# Patient Record
Sex: MALE | Race: BLACK OR AFRICAN AMERICAN | NOT HISPANIC OR LATINO
[De-identification: names, ages, dates, MRNs, and addresses within clinical notes are randomized per-mention and may not be internally consistent; named-entity substitution may affect disease eponyms.]

---

## 2020-12-31 VITALS
HEART RATE: 86 BPM | SYSTOLIC BLOOD PRESSURE: 171 MMHG | DIASTOLIC BLOOD PRESSURE: 92 MMHG | OXYGEN SATURATION: 99 % | WEIGHT: 202.83 LBS | RESPIRATION RATE: 15 BRPM | HEIGHT: 69 IN | TEMPERATURE: 98 F

## 2020-12-31 RX ORDER — CHLORHEXIDINE GLUCONATE 213 G/1000ML
1 SOLUTION TOPICAL ONCE
Refills: 0 | Status: DISCONTINUED | OUTPATIENT
Start: 2021-01-05 | End: 2021-01-19

## 2020-12-31 NOTE — H&P ADULT - HISTORY OF PRESENT ILLNESS
COVID testing @  Cardiologist:  Pharmacy:  Escort:    Confirm meds upon arrival    Patient is a 69 y/o former smoker male with PMHx of known CAD (s/p PCI of OM2), HTN, HLD, and DM II who presented to cardiologist Dr. Sheth c/o NAVARRO. Patient endorses NAVARRO with ambulation of one-two blocks, resolved with rest and associated with chest tightness which subsequently resolves with rest. Patient denies palpitations, syncope, PND/orthopnea, or LE edema. Also denies fever, chills, N/V/D, URI symptoms, cough, recent illness, travel, or sick contacts. NST 12/10/2020: EF 50%, apical septal wall ischemia and inferior wall infarct with residual ischemia identified. Per MD note echo revealed EF 55%, LVH, dilated LA, abnormal LV compliance, mild MR.     In light of patient's risk factors, CCS III anginal symptoms, known CAD, and abnormal NST patient is referred for cardiac catheterization with possible intervention if clinically indicated to r/o progression of CAD         COVID testing @ 1/02/2021 at [place]  Cardiologist: Dr. Sheth   Pharmacy: [facility], [phone]  Escort: wife    Confirm meds upon arrival    Patient is a 69 y/o former smoker male with PMHx of known CAD (s/p PCI of OM2, 12 years ago), HTN, HLD, and DM II who presented to cardiologist Dr. Sheth c/o NAVARRO. Patient endorses NAVARRO with ambulation of one-two blocks, resolved with rest and associated with chest tightness which subsequently resolves with rest. Patient denies palpitations, syncope, PND/orthopnea, or LE edema. Also denies fever, chills, N/V/D, URI symptoms, cough, recent illness, travel, or sick contacts. NST 12/10/2020: EF 50%, apical septal wall ischemia and inferior wall infarct with residual ischemia identified. Per MD note echo revealed EF 55%, LVH, dilated LA, abnormal LV compliance, mild MR.     In light of patient's risk factors, CCS III anginal symptoms, known CAD, and abnormal NST patient is referred for cardiac catheterization with possible intervention if clinically indicated to r/o progression of CAD         COVID testing @ 1/02/2021 at [place] negative per [facility]  Cardiologist: Dr. Sheth   Pharmacy: [facility], [phone]  Escort: wife    Confirm meds upon arrival    Patient is a 69 y/o former smoker male with PMHx of known CAD (s/p PCI of OM2, 12 years ago), HTN, HLD, and DM II who presented to cardiologist Dr. Sheth c/o NAVARRO. Patient endorses NAVARRO with ambulation of one-two blocks, resolved with rest and associated with chest tightness which subsequently resolves with rest. Patient denies palpitations, syncope, PND/orthopnea, or LE edema. Also denies fever, chills, N/V/D, URI symptoms, cough, recent illness, travel, or sick contacts. NST 12/10/2020: EF 50%, apical septal wall ischemia and inferior wall infarct with residual ischemia identified. Per MD note echo revealed EF 55%, LVH, dilated LA, abnormal LV compliance, mild MR.     In light of patient's risk factors, CCS III anginal symptoms, known CAD, and abnormal NST patient is referred for cardiac catheterization with possible intervention if clinically indicated.         COVID negative @ 1/02/2021 at [place] negative per [facility]  Cardiologist: Dr. Sheth   Pharmacy: [facility], [phone]  Escort: Wife     71 y/o male, former smoker, w/ PMHx known CAD (s/p PCI of OM2, 12 years ago), HTN, HLD, and type II DM who presented to cardiologist, Dr. Sheth, c/o NAVARRO w/ ambulation of one-two blocks, resolved w/ rest and associated w/ chest tightness which subsequently resolves w/ rest. Patient denied palpitations, syncope, PND/orthopnea, LE edema, fever, chills, N/V/D, URI symptoms, cough, recent illness, travel, or sick contacts. NST 12/10/2020 revealed EF 50%, apical septal wall ischemia and inferior wall infarct w/ residual ischemia identified. Per MD note, Echocardiogram revealed EF 55%, LVH, dilated LA, abnormal LV compliance, and mild MR.     In light of patient's risk factors, CCS III anginal symptoms, known CAD, and abnormal NST patient is referred for cardiac catheterization w/ possible intervention if clinically indicated.

## 2020-12-31 NOTE — H&P ADULT - ASSESSMENT
69 y/o male, former smoker, w/ PMHx known CAD (s/p PCI of OM2, 12 years ago), HTN, HLD, and type II DM who presents for cardiac catheterization w/ possible intervention if clinically indicated in light of patient's risk factors, CCS class III anginal symptoms, and abnormal NST results.     EKG on arrival showed NSR at 80 bpm w/o acute ischemic changes. Labs on arrival significant for WBC 12.28; patient denies any fever, chills, cough, known sick contacts, or urinary symptoms. Labs otherwise within normal limits and Dr. Sheth was made aware. Patient reported he did not take his daily medication of Aspirin 81 mg daily prior to arrival. Patient was ordered for Aspirin 81 mg PO once in addition to Plavix 600 mg PO once for pre-procedure load. Patient was also ordered for NS 75 cc/hour x 4 hours. Of note, patient's BP was elevated to 171/92 mmHg on arrival to cath lab holding. Patient reported taking his home medication of Telmisartan 40 mg daily prior to arrival on 01/05/2021, but patient did not take his home medication of Toprol XL 25 mg daily. Patient was given Toprol XL 25 mg daily prior to procedure.   					  ASA: III			Mallampati class: III	            Anginal Class: III      Sedation Plan: Moderate     Patient Is Suitable Candidate For Sedation: Yes     Risks & benefits of procedure and sedation and risks and benefits for the alternative therapy have been explained to the patient in layman’s terms including but not limited to: allergic reaction, bleeding, infection, arrhythmia, respiratory compromise, renal and vascular compromise, limb damage, MI, CVA, emergent CABG/Vascular Surgery and death. Informed consent obtained and in chart.

## 2020-12-31 NOTE — H&P ADULT - NSICDXPASTSURGICALHX_GEN_ALL_CORE_FT
PAST SURGICAL HISTORY:  Cataract L cataract surgery 6/09     PAST SURGICAL HISTORY:  Cataract L cataract surgery 6/09    H/O knee surgery     Previous back surgery

## 2020-12-31 NOTE — H&P ADULT - NSHPLABSRESULTS_GEN_ALL_CORE
CBC Full  -  ( 05 Jan 2021 07:40 )  WBC Count : 12.28 K/uL  RBC Count : 5.39 M/uL  Hemoglobin : 14.2 g/dL  Hematocrit : 43.9 %  Platelet Count - Automated : 293 K/uL  Mean Cell Volume : 81.4 fl  Mean Cell Hemoglobin : 26.3 pg  Mean Cell Hemoglobin Concentration : 32.3 gm/dL  Auto Neutrophil # : 8.42 K/uL  Auto Lymphocyte # : 2.38 K/uL  Auto Monocyte # : 0.98 K/uL  Auto Eosinophil # : 0.32 K/uL  Auto Basophil # : 0.08 K/uL  Auto Neutrophil % : 68.5 %  Auto Lymphocyte % : 19.4 %  Auto Monocyte % : 8.0 %  Auto Eosinophil % : 2.6 %  Auto Basophil % : 0.7 %    01-05    138  |  100  |  9   ----------------------------<  185<H>  4.2   |  26  |  0.81    Ca    9.1      05 Jan 2021 07:40    TPro  7.8  /  Alb  4.2  /  TBili  1.0  /  DBili  x   /  AST  16  /  ALT  22  /  AlkPhos  99  01-05      PT/INR - ( 05 Jan 2021 07:40 )   PT: 12.2 sec;   INR: 1.02          PTT - ( 05 Jan 2021 07:40 )  PTT:32.0 sec

## 2020-12-31 NOTE — H&P ADULT - NSICDXPASTMEDICALHX_GEN_ALL_CORE_FT
PAST MEDICAL HISTORY:  Coronary artery disease     Diabetes     Gout     Hyperlipidemia     Hypertension

## 2020-12-31 NOTE — H&P ADULT - NSHPSOCIALHISTORY_GEN_ALL_CORE
Former smoker Former smoker x 20 years 0.5ppd, quit 12 years ago  Denies ETOH or illicit drug use Patient is a former smoker, quit 12 years ago, and prevuiously smoked 1/2 PPD for 20 years. Patient denies any ETOH use or illicit drug use.

## 2021-01-02 ENCOUNTER — APPOINTMENT (OUTPATIENT)
Dept: DISASTER EMERGENCY | Facility: CLINIC | Age: 71
End: 2021-01-02

## 2021-01-02 PROBLEM — I10 ESSENTIAL (PRIMARY) HYPERTENSION: Chronic | Status: ACTIVE | Noted: 2020-12-31

## 2021-01-02 PROBLEM — Z00.00 ENCOUNTER FOR PREVENTIVE HEALTH EXAMINATION: Status: ACTIVE | Noted: 2021-01-02

## 2021-01-02 PROBLEM — E78.5 HYPERLIPIDEMIA, UNSPECIFIED: Chronic | Status: ACTIVE | Noted: 2020-12-31

## 2021-01-02 PROBLEM — I25.10 ATHEROSCLEROTIC HEART DISEASE OF NATIVE CORONARY ARTERY WITHOUT ANGINA PECTORIS: Chronic | Status: ACTIVE | Noted: 2020-12-31

## 2021-01-02 PROBLEM — E11.9 TYPE 2 DIABETES MELLITUS WITHOUT COMPLICATIONS: Chronic | Status: ACTIVE | Noted: 2020-12-31

## 2021-01-03 LAB — SARS-COV-2 N GENE NPH QL NAA+PROBE: NOT DETECTED

## 2021-01-05 ENCOUNTER — RESULT REVIEW (OUTPATIENT)
Age: 71
End: 2021-01-05

## 2021-01-05 ENCOUNTER — OUTPATIENT (OUTPATIENT)
Dept: OUTPATIENT SERVICES | Facility: HOSPITAL | Age: 71
LOS: 1 days | Discharge: HOME | End: 2021-01-05
Payer: MEDICARE

## 2021-01-05 ENCOUNTER — NON-APPOINTMENT (OUTPATIENT)
Age: 71
End: 2021-01-05

## 2021-01-05 DIAGNOSIS — Z98.890 OTHER SPECIFIED POSTPROCEDURAL STATES: Chronic | ICD-10-CM

## 2021-01-05 LAB
A1C WITH ESTIMATED AVERAGE GLUCOSE RESULT: 8.1 % — HIGH (ref 4–5.6)
ALBUMIN SERPL ELPH-MCNC: 4.2 G/DL — SIGNIFICANT CHANGE UP (ref 3.3–5)
ALP SERPL-CCNC: 99 U/L — SIGNIFICANT CHANGE UP (ref 40–120)
ALT FLD-CCNC: 22 U/L — SIGNIFICANT CHANGE UP (ref 10–45)
ANION GAP SERPL CALC-SCNC: 12 MMOL/L — SIGNIFICANT CHANGE UP (ref 5–17)
APPEARANCE UR: CLEAR — SIGNIFICANT CHANGE UP
APTT BLD: 32 SEC — SIGNIFICANT CHANGE UP (ref 27.5–35.5)
AST SERPL-CCNC: 16 U/L — SIGNIFICANT CHANGE UP (ref 10–40)
BASOPHILS # BLD AUTO: 0.07 K/UL — SIGNIFICANT CHANGE UP (ref 0–0.2)
BASOPHILS # BLD AUTO: 0.08 K/UL — SIGNIFICANT CHANGE UP (ref 0–0.2)
BASOPHILS NFR BLD AUTO: 0.6 % — SIGNIFICANT CHANGE UP (ref 0–2)
BASOPHILS NFR BLD AUTO: 0.7 % — SIGNIFICANT CHANGE UP (ref 0–2)
BILIRUB SERPL-MCNC: 1 MG/DL — SIGNIFICANT CHANGE UP (ref 0.2–1.2)
BILIRUB UR-MCNC: NEGATIVE — SIGNIFICANT CHANGE UP
BLD GP AB SCN SERPL QL: NEGATIVE — SIGNIFICANT CHANGE UP
BUN SERPL-MCNC: 9 MG/DL — SIGNIFICANT CHANGE UP (ref 7–23)
CALCIUM SERPL-MCNC: 9.1 MG/DL — SIGNIFICANT CHANGE UP (ref 8.4–10.5)
CHLORIDE SERPL-SCNC: 100 MMOL/L — SIGNIFICANT CHANGE UP (ref 96–108)
CHOLEST SERPL-MCNC: 133 MG/DL — SIGNIFICANT CHANGE UP
CK MB CFR SERPL CALC: 1.6 NG/ML — SIGNIFICANT CHANGE UP (ref 0–6.7)
CK SERPL-CCNC: 52 U/L — SIGNIFICANT CHANGE UP (ref 30–200)
CO2 SERPL-SCNC: 26 MMOL/L — SIGNIFICANT CHANGE UP (ref 22–31)
COLOR SPEC: YELLOW — SIGNIFICANT CHANGE UP
CREAT SERPL-MCNC: 0.81 MG/DL — SIGNIFICANT CHANGE UP (ref 0.5–1.3)
CRP SERPL-MCNC: 0.7 MG/DL — HIGH (ref 0–0.4)
DIFF PNL FLD: NEGATIVE — SIGNIFICANT CHANGE UP
EOSINOPHIL # BLD AUTO: 0.22 K/UL — SIGNIFICANT CHANGE UP (ref 0–0.5)
EOSINOPHIL # BLD AUTO: 0.32 K/UL — SIGNIFICANT CHANGE UP (ref 0–0.5)
EOSINOPHIL NFR BLD AUTO: 1.9 % — SIGNIFICANT CHANGE UP (ref 0–6)
EOSINOPHIL NFR BLD AUTO: 2.6 % — SIGNIFICANT CHANGE UP (ref 0–6)
ESTIMATED AVERAGE GLUCOSE: 186 MG/DL — HIGH (ref 68–114)
GLUCOSE SERPL-MCNC: 185 MG/DL — HIGH (ref 70–99)
GLUCOSE UR QL: NEGATIVE — SIGNIFICANT CHANGE UP
HCT VFR BLD CALC: 40.2 % — SIGNIFICANT CHANGE UP (ref 39–50)
HCT VFR BLD CALC: 43.9 % — SIGNIFICANT CHANGE UP (ref 39–50)
HCV AB S/CO SERPL IA: 3.4 S/CO — SIGNIFICANT CHANGE UP
HCV AB SERPL-IMP: ABNORMAL
HDLC SERPL-MCNC: 46 MG/DL — SIGNIFICANT CHANGE UP
HGB BLD-MCNC: 13.2 G/DL — SIGNIFICANT CHANGE UP (ref 13–17)
HGB BLD-MCNC: 14.2 G/DL — SIGNIFICANT CHANGE UP (ref 13–17)
IMM GRANULOCYTES NFR BLD AUTO: 0.6 % — SIGNIFICANT CHANGE UP (ref 0–1.5)
IMM GRANULOCYTES NFR BLD AUTO: 0.8 % — SIGNIFICANT CHANGE UP (ref 0–1.5)
INR BLD: 1.02 — SIGNIFICANT CHANGE UP (ref 0.88–1.16)
KETONES UR-MCNC: NEGATIVE — SIGNIFICANT CHANGE UP
LEUKOCYTE ESTERASE UR-ACNC: NEGATIVE — SIGNIFICANT CHANGE UP
LIPID PNL WITH DIRECT LDL SERPL: 62 MG/DL — SIGNIFICANT CHANGE UP
LYMPHOCYTES # BLD AUTO: 1.89 K/UL — SIGNIFICANT CHANGE UP (ref 1–3.3)
LYMPHOCYTES # BLD AUTO: 16.3 % — SIGNIFICANT CHANGE UP (ref 13–44)
LYMPHOCYTES # BLD AUTO: 19.4 % — SIGNIFICANT CHANGE UP (ref 13–44)
LYMPHOCYTES # BLD AUTO: 2.38 K/UL — SIGNIFICANT CHANGE UP (ref 1–3.3)
MCHC RBC-ENTMCNC: 26.3 PG — LOW (ref 27–34)
MCHC RBC-ENTMCNC: 26.5 PG — LOW (ref 27–34)
MCHC RBC-ENTMCNC: 32.3 GM/DL — SIGNIFICANT CHANGE UP (ref 32–36)
MCHC RBC-ENTMCNC: 32.8 GM/DL — SIGNIFICANT CHANGE UP (ref 32–36)
MCV RBC AUTO: 80.6 FL — SIGNIFICANT CHANGE UP (ref 80–100)
MCV RBC AUTO: 81.4 FL — SIGNIFICANT CHANGE UP (ref 80–100)
MONOCYTES # BLD AUTO: 0.81 K/UL — SIGNIFICANT CHANGE UP (ref 0–0.9)
MONOCYTES # BLD AUTO: 0.98 K/UL — HIGH (ref 0–0.9)
MONOCYTES NFR BLD AUTO: 7 % — SIGNIFICANT CHANGE UP (ref 2–14)
MONOCYTES NFR BLD AUTO: 8 % — SIGNIFICANT CHANGE UP (ref 2–14)
NEUTROPHILS # BLD AUTO: 8.42 K/UL — HIGH (ref 1.8–7.4)
NEUTROPHILS # BLD AUTO: 8.5 K/UL — HIGH (ref 1.8–7.4)
NEUTROPHILS NFR BLD AUTO: 68.5 % — SIGNIFICANT CHANGE UP (ref 43–77)
NEUTROPHILS NFR BLD AUTO: 73.6 % — SIGNIFICANT CHANGE UP (ref 43–77)
NITRITE UR-MCNC: NEGATIVE — SIGNIFICANT CHANGE UP
NON HDL CHOLESTEROL: 87 MG/DL — SIGNIFICANT CHANGE UP
NRBC # BLD: 0 /100 WBCS — SIGNIFICANT CHANGE UP (ref 0–0)
NRBC # BLD: 0 /100 WBCS — SIGNIFICANT CHANGE UP (ref 0–0)
PH UR: 7.5 — SIGNIFICANT CHANGE UP (ref 5–8)
PLATELET # BLD AUTO: 270 K/UL — SIGNIFICANT CHANGE UP (ref 150–400)
PLATELET # BLD AUTO: 293 K/UL — SIGNIFICANT CHANGE UP (ref 150–400)
POTASSIUM SERPL-MCNC: 4.2 MMOL/L — SIGNIFICANT CHANGE UP (ref 3.5–5.3)
POTASSIUM SERPL-SCNC: 4.2 MMOL/L — SIGNIFICANT CHANGE UP (ref 3.5–5.3)
PROT SERPL-MCNC: 7.8 G/DL — SIGNIFICANT CHANGE UP (ref 6–8.3)
PROT UR-MCNC: NEGATIVE MG/DL — SIGNIFICANT CHANGE UP
PROTHROM AB SERPL-ACNC: 12.2 SEC — SIGNIFICANT CHANGE UP (ref 10.6–13.6)
RBC # BLD: 4.99 M/UL — SIGNIFICANT CHANGE UP (ref 4.2–5.8)
RBC # BLD: 5.39 M/UL — SIGNIFICANT CHANGE UP (ref 4.2–5.8)
RBC # FLD: 15.7 % — HIGH (ref 10.3–14.5)
RBC # FLD: 15.8 % — HIGH (ref 10.3–14.5)
RH IG SCN BLD-IMP: POSITIVE — SIGNIFICANT CHANGE UP
SODIUM SERPL-SCNC: 138 MMOL/L — SIGNIFICANT CHANGE UP (ref 135–145)
SP GR SPEC: 1.01 — SIGNIFICANT CHANGE UP (ref 1–1.03)
TRIGL SERPL-MCNC: 126 MG/DL — SIGNIFICANT CHANGE UP
TSH SERPL-MCNC: 1.85 UIU/ML — SIGNIFICANT CHANGE UP (ref 0.35–4.94)
UROBILINOGEN FLD QL: 0.2 E.U./DL — SIGNIFICANT CHANGE UP
WBC # BLD: 11.56 K/UL — HIGH (ref 3.8–10.5)
WBC # BLD: 12.28 K/UL — HIGH (ref 3.8–10.5)
WBC # FLD AUTO: 11.56 K/UL — HIGH (ref 3.8–10.5)
WBC # FLD AUTO: 12.28 K/UL — HIGH (ref 3.8–10.5)

## 2021-01-05 PROCEDURE — 81003 URINALYSIS AUTO W/O SCOPE: CPT

## 2021-01-05 PROCEDURE — 71045 X-RAY EXAM CHEST 1 VIEW: CPT | Mod: 26

## 2021-01-05 PROCEDURE — 85730 THROMBOPLASTIN TIME PARTIAL: CPT

## 2021-01-05 PROCEDURE — 86803 HEPATITIS C AB TEST: CPT

## 2021-01-05 PROCEDURE — C1769: CPT

## 2021-01-05 PROCEDURE — 82553 CREATINE MB FRACTION: CPT

## 2021-01-05 PROCEDURE — 93880 EXTRACRANIAL BILAT STUDY: CPT

## 2021-01-05 PROCEDURE — 86850 RBC ANTIBODY SCREEN: CPT

## 2021-01-05 PROCEDURE — 93458 L HRT ARTERY/VENTRICLE ANGIO: CPT

## 2021-01-05 PROCEDURE — 94150 VITAL CAPACITY TEST: CPT

## 2021-01-05 PROCEDURE — 93010 ELECTROCARDIOGRAM REPORT: CPT

## 2021-01-05 PROCEDURE — 93880 EXTRACRANIAL BILAT STUDY: CPT | Mod: 26

## 2021-01-05 PROCEDURE — 80061 LIPID PANEL: CPT

## 2021-01-05 PROCEDURE — 82550 ASSAY OF CK (CPK): CPT

## 2021-01-05 PROCEDURE — 86900 BLOOD TYPING SEROLOGIC ABO: CPT

## 2021-01-05 PROCEDURE — 80053 COMPREHEN METABOLIC PANEL: CPT

## 2021-01-05 PROCEDURE — 99205 OFFICE O/P NEW HI 60 MIN: CPT

## 2021-01-05 PROCEDURE — C1887: CPT

## 2021-01-05 PROCEDURE — 85610 PROTHROMBIN TIME: CPT

## 2021-01-05 PROCEDURE — 83036 HEMOGLOBIN GLYCOSYLATED A1C: CPT

## 2021-01-05 PROCEDURE — 84443 ASSAY THYROID STIM HORMONE: CPT

## 2021-01-05 PROCEDURE — 94010 BREATHING CAPACITY TEST: CPT | Mod: 26

## 2021-01-05 PROCEDURE — 85025 COMPLETE CBC W/AUTO DIFF WBC: CPT

## 2021-01-05 PROCEDURE — 86901 BLOOD TYPING SEROLOGIC RH(D): CPT

## 2021-01-05 PROCEDURE — 93458 L HRT ARTERY/VENTRICLE ANGIO: CPT | Mod: 26

## 2021-01-05 PROCEDURE — C1894: CPT

## 2021-01-05 PROCEDURE — 71045 X-RAY EXAM CHEST 1 VIEW: CPT

## 2021-01-05 PROCEDURE — 93005 ELECTROCARDIOGRAM TRACING: CPT

## 2021-01-05 PROCEDURE — 86140 C-REACTIVE PROTEIN: CPT

## 2021-01-05 RX ORDER — SODIUM CHLORIDE 9 MG/ML
500 INJECTION INTRAMUSCULAR; INTRAVENOUS; SUBCUTANEOUS
Refills: 0 | Status: DISCONTINUED | OUTPATIENT
Start: 2021-01-05 | End: 2021-01-05

## 2021-01-05 RX ORDER — ASPIRIN/CALCIUM CARB/MAGNESIUM 324 MG
81 TABLET ORAL ONCE
Refills: 0 | Status: COMPLETED | OUTPATIENT
Start: 2021-01-05 | End: 2021-01-05

## 2021-01-05 RX ORDER — METOPROLOL TARTRATE 50 MG
25 TABLET ORAL ONCE
Refills: 0 | Status: DISCONTINUED | OUTPATIENT
Start: 2021-01-05 | End: 2021-01-19

## 2021-01-05 RX ORDER — SODIUM CHLORIDE 9 MG/ML
500 INJECTION INTRAMUSCULAR; INTRAVENOUS; SUBCUTANEOUS
Refills: 0 | Status: DISCONTINUED | OUTPATIENT
Start: 2021-01-05 | End: 2021-01-19

## 2021-01-05 RX ORDER — CLOPIDOGREL BISULFATE 75 MG/1
600 TABLET, FILM COATED ORAL ONCE
Refills: 0 | Status: COMPLETED | OUTPATIENT
Start: 2021-01-05 | End: 2021-01-05

## 2021-01-05 RX ADMIN — Medication 81 MILLIGRAM(S): at 08:46

## 2021-01-05 RX ADMIN — SODIUM CHLORIDE 75 MILLILITER(S): 9 INJECTION INTRAMUSCULAR; INTRAVENOUS; SUBCUTANEOUS at 12:17

## 2021-01-05 RX ADMIN — CLOPIDOGREL BISULFATE 600 MILLIGRAM(S): 75 TABLET, FILM COATED ORAL at 08:46

## 2021-01-05 RX ADMIN — SODIUM CHLORIDE 75 MILLILITER(S): 9 INJECTION INTRAMUSCULAR; INTRAVENOUS; SUBCUTANEOUS at 08:41

## 2021-01-05 NOTE — CONSULT NOTE ADULT - SUBJECTIVE AND OBJECTIVE BOX
69 y/o male, former smoker, w/ PMHx known CAD (s/p PCI of OM2, 12 years ago), HTN, HLD, and type II DM who presented to cardiologist, Dr. Sheth, c/o NAVARRO w/ ambulation of one-two blocks, resolved w/ rest and associated w/ chest tightness which subsequently resolves w/ rest. Patient denied palpitations, syncope, PND/orthopnea, LE edema, fever, chills, N/V/D, URI symptoms, cough, recent illness, travel, or sick contacts. NST 12/10/2020 revealed EF 50%, apical septal wall ischemia and inferior wall infarct w/ residual ischemia identified. Per MD note, Echocardiogram revealed EF 55%, LVH, dilated LA, abnormal LV compliance, and mild MR.In light of patient's risk factors, CCS III anginal symptoms, known CAD, and abnormal NST patient is referred for cardiac catheterization w/ possible intervention if clinically indicated. 1/5/21 Cardiac cath revealed 3 vessel CAD. Dr. Acuna consulted for evaluation for midcab as part of hybrid procedure.    Review of Systems: Other Review of Systems: All other review of systems negative, except as noted in HPI    Allergies and Intolerances:       Allergies: 	No Known Allergies:   Home Medications:  * Patient Currently Takes Medications as of 05-Jan-2021 09:10 documented in Structured Notes · 	glipiZIDE 5 mg oral tablet, extended release: Last Dose Taken: 04-Jan-2021 AM, 1 tab(s) orally once a day · 	metFORMIN 500 mg oral tablet: Last Dose Taken: 04-Jan-2021 PM, 1 tab(s) orally 2 times a day · 	allopurinol 300 mg oral tablet: Last Dose Taken: 05-Jan-2021 AM, 1 tab(s) orally once a day · 	Toprol-XL 25 mg oral tablet, extended release: Last Dose Taken: 05-Jan-2021 AM, 1 tab(s) orally once a day · 	telmisartan 40 mg oral tablet: Last Dose Taken: 05-Jan-2021 AM, 1 tab(s) orally once a day · 	Basaglar KwikPen 100 units/mL subcutaneous solution: Last Dose Taken: 04-Jan-2021 PM, 30 unit(s) subcutaneous once a day (at bedtime) · 	atorvastatin 40 mg oral tablet: Last Dose Taken: 04-Jan-2021 PM, 1 tab(s) orally once a day (at bedtime) · 	Aspirin Enteric Coated 81 mg oral delayed release tablet: Last Dose Taken: 04-Jan-2021 AM, 1 tab(s) orally once a day  Patient History:  Past Medical, Past Surgical, and Family History: PAST MEDICAL HISTORY: Coronary artery disease   Diabetes   Gout   Hyperlipidemia   Hypertension.   PAST SURGICAL HISTORY: Cataract L cataract surgery 6/09  H/O knee surgery   Previous back surgery.   No pertinent family history in first degree relatives.   No Pertinent Family History in first degree relatives of: CAD.  Social History: Social History (marital status, living situation, occupation, tobacco use, alcohol and drug use, and sexual history): Patient is a former smoker, quit 12 years ago, and prevuiously smoked 1/2 PPD for 20 years. Patient denies any ETOH use or illicit drug use.   Tobacco Screening: · Core Measure Site	No · Has the patient used tobacco in the past 30 days?	No   Risk Assessment:  Present on Admission: Deep Venous Thrombosis	no Pulmonary Embolus	no  Heart Failure: Does this patient have a history of or has been diagnosed with heart failure? no.  Physical Exam:  Height/Weight/BSA/BMI: · Height (FEET)	5 Feet · Height (INCHES)	9 Inch(s) · Height (CENTIMETERS)	175.26 Centimeter(s) · 	 used  · Dosing Weight (KILOGRAMS)	92 kg · Dosing Weight  (POUNDS)	202.8 Pound(s) · BSA (m2)	2.08 Meter Squared · BMI (kG/m2)	30   T/HR/RR/BP: · Temp (F)	97.6 Degrees F · Temp (C)	36.4 Degrees C · Heart Rate	86 /min · Respiration Rate (breaths/min)	15 /min · BP Systolic	  171 mm Hg · BP Diastolic	92 mm Hg · Blood Pressure - Method	electronic · BP Noninvasive Mean	118 mm Hg · SpO2 (%)	99 % · O2 Delivery/Oxygen Delivery Method	room air   Physical Exam: · Constitutional	Well-developed, well nourished · Eyes	EOMI; PERRL; no drainage or redness · ENMT	No oral lesions; no gross abnormalities · Neck	detailed exam  · Neck Details	normal; supple; no JVD · Breasts	not examined · Back	not examined  · Respiratory	detailed exam · Respiratory Details	normal; clear to auscultation bilaterally; no rales; no rhonchi; no wheezes · Cardiovascular	detailed exam · Cardiovascular Details	regular rate and rhythm  no rub  no murmur  normal PMI  · Cardiovascular Details	positive S1; positive S2 · Gastrointestinal	Soft, non-tender, no hepatosplenomegaly, normal bowel sounds · Genitourinary	not examined  · Rectal	patient refused  · Extremities	detailed exam  · Extremities Details	normal; no clubbing; no cyanosis; no pedal edema · Vascular	detailed exam  · Radial Pulse	2+ bilateral · Femoral Pulse	1+ bilateral, no bruits bilateral · DP Pulse	1+ bilateral · PT Pulse	2+ bilateral · Neurological	Alert & oriented; no sensory, motor or coordination deficits, normal reflexes · Skin	No lesions; no rash · Lymph Nodes	not examined · Musculoskeletal	No joint pain, swelling or deformity; no limitation of movement · Psychiatric	Affect and characteristics of appearance, verbalizations, behaviors are appropriate   Labs and Results: Labs, Radiology, Cardiology, and Other Results: CBC Full  -  ( 05 Jan 2021 07:40 ) WBC Count : 12.28 K/uL RBC Count : 5.39 M/uL Hemoglobin : 14.2 g/dL Hematocrit : 43.9 % Platelet Count - Automated : 293 K/uL Mean Cell Volume : 81.4 fl Mean Cell Hemoglobin : 26.3 pg Mean Cell Hemoglobin Concentration : 32.3 gm/dL Auto Neutrophil # : 8.42 K/uL Auto Lymphocyte # : 2.38 K/uL Auto Monocyte # : 0.98 K/uL Auto Eosinophil # : 0.32 K/uL Auto Basophil # : 0.08 K/uL Auto Neutrophil % : 68.5 % Auto Lymphocyte % : 19.4 % Auto Monocyte % : 8.0 % Auto Eosinophil % : 2.6 % Auto Basophil % : 0.7 %  01-05  138  |  100  |  9  ----------------------------<  185<H> 4.2   |  26  |  0.81  Ca    9.1      05 Jan 2021 07:40  TPro  7.8  /  Alb  4.2  /  TBili  1.0  /  DBili  x   /  AST  16  /  ALT  22  /  AlkPhos  99  01-05   PT/INR - ( 05 Jan 2021 07:40 )   PT: 12.2 sec;   INR: 1.02       PTT - ( 05 Jan 2021 07:40 )  PTT:32.0 sec

## 2021-01-05 NOTE — CONSULT NOTE ADULT - ASSESSMENT
71 yo male with PMH of HTN, HLD, DM, CAD s/p PCIs and class III angina s/p cardiac cath that revealed severe 3 vessel CAD. Dr. Acuna reviewed the cardiac cath imaging and reports with the patient and discussed the case with Dr. Sheth.  Dr. Acuna discussed the risks, benefits and alternatives to surgery. Risks include but not limited to death, heart attack, bleeding, stroke, kidney problems and infection. He quoted a 1% operative mortality and complication risk.  He also discussed the various approaches, minimally invasive versus sternotomy. Dr. Acuna feels the patient will benefit and is a candidate for robotic assisted MIDCAB (LIMA->LAD) as part of hybrid procedure. All questions were addressed and patient agrees to proceed with surgery.     Plan:   pst today--labs, xray, carotids, pfts, u/a  covid test 1/8  SDA 1/11  pt instructed to take metoprolol the morning of surgery  instructions provided re antibacterial showers and pt given 3 sponges  pt instructed on use of incentive spirometer and demonstrated use to 1700cc  PCI of LCX to be performed post surgery

## 2021-01-05 NOTE — PROGRESS NOTE ADULT - SUBJECTIVE AND OBJECTIVE BOX
Interventional Cardiology PA SDA Discharge Note    Patient without complaints. Ambulated and voided without difficulties    Afebrile, VSS    Ext:          Right            Radial :   no  hematoma,  no  bleeding, dressing; C/D/I      Pulses:    intact RAD to baseline     A/P:        69 y/o male, former smoker, w/ PMHx known CAD (s/p PCI of OM2, 12 years ago), HTN, HLD, and type II DM who presented to cardiologist, Dr. Sheth, c/o NAVARRO w/ ambulation of one-two blocks, resolved w/ rest and associated w/ chest tightness which subsequently resolves w/ rest. Patient denied palpitations, syncope, PND/orthopnea, LE edema, fever, chills, N/V/D, URI symptoms, cough, recent illness, travel, or sick contacts. NST 12/10/2020 revealed EF 50%, apical septal wall ischemia and inferior wall infarct w/ residual ischemia identified. Per MD note, Echocardiogram revealed EF 55%, LVH, dilated LA, abnormal LV compliance, and mild MR.     S/p cardiac cath revealing distalLM 50%, ostialLAD 90%, midLAD 90%, proxLCx 90%, OM2 patent stent iFR 0.93, distalRCA 100% w/ L-R collaterals. EF50%, EDP 11mmHg,       Patient being evaluate by Dr. Acuna CT surgery for LIMA to LAD with PCI to LCx  Patient advised to hold metforming for 2 days following procedure.     1.	Stable for discharge as per attending Dr. Sheth after bed rest, pt voids, groin/wrist stable and 30 minutes of ambulation.  2.	Follow-up with PMD/Cardiologist Dr. Sheth in 1-2 weeks  3.	Discharged forms signed and copies in chart

## 2021-01-06 ENCOUNTER — NON-APPOINTMENT (OUTPATIENT)
Age: 71
End: 2021-01-06

## 2021-01-06 VITALS — WEIGHT: 202.83 LBS | HEIGHT: 69 IN | BODY MASS INDEX: 30.04 KG/M2

## 2021-01-06 VITALS
SYSTOLIC BLOOD PRESSURE: 171 MMHG | OXYGEN SATURATION: 99 % | WEIGHT: 202.83 LBS | RESPIRATION RATE: 18 BRPM | DIASTOLIC BLOOD PRESSURE: 90 MMHG | HEART RATE: 86 BPM | TEMPERATURE: 98 F

## 2021-01-06 DIAGNOSIS — Z86.39 PERSONAL HISTORY OF OTHER ENDOCRINE, NUTRITIONAL AND METABOLIC DISEASE: ICD-10-CM

## 2021-01-06 DIAGNOSIS — Z01.818 ENCOUNTER FOR OTHER PREPROCEDURAL EXAMINATION: ICD-10-CM

## 2021-01-06 DIAGNOSIS — Z87.39 PERSONAL HISTORY OF OTHER DISEASES OF THE MUSCULOSKELETAL SYSTEM AND CONNECTIVE TISSUE: ICD-10-CM

## 2021-01-06 DIAGNOSIS — Z86.79 PERSONAL HISTORY OF OTHER DISEASES OF THE CIRCULATORY SYSTEM: ICD-10-CM

## 2021-01-06 DIAGNOSIS — I25.10 ATHEROSCLEROTIC HEART DISEASE OF NATIVE CORONARY ARTERY W/OUT ANGINA PECTORIS: ICD-10-CM

## 2021-01-06 DIAGNOSIS — Z87.891 PERSONAL HISTORY OF NICOTINE DEPENDENCE: ICD-10-CM

## 2021-01-06 RX ORDER — ATORVASTATIN CALCIUM 40 MG/1
40 TABLET, FILM COATED ORAL
Qty: 30 | Refills: 5 | Status: ACTIVE | COMMUNITY

## 2021-01-06 RX ORDER — ALLOPURINOL 300 MG/1
300 TABLET ORAL DAILY
Qty: 30 | Refills: 2 | Status: ACTIVE | COMMUNITY

## 2021-01-06 RX ORDER — ASPIRIN 81 MG
81 TABLET, DELAYED RELEASE (ENTERIC COATED) ORAL DAILY
Qty: 1 | Refills: 5 | Status: ACTIVE | COMMUNITY

## 2021-01-06 NOTE — H&P ADULT - ASSESSMENT
69 yo male with PMH of HTN, HLD, DM, CAD s/p PCIs and class III angina s/p cardiac cath that revealed severe 3 vessel CAD. Dr. Acuna reviewed the cardiac cath imaging and reports with the patient and discussed the case with Dr. Sheth.  Dr. Acuna discussed the risks, benefits and alternatives to surgery. Risks include but not limited to death, heart attack, bleeding, stroke, kidney problems and infection. He quoted a 1% operative mortality and complication risk.  He also discussed the various approaches, minimally invasive versus sternotomy. Dr. Acuna feels the patient will benefit and is a candidate for robotic assisted MIDCAB (LIMA->LAD) as part of hybrid procedure. All questions were addressed and patient agrees to proceed with surgery.     Plan:   pst   covid test 1/8, results in [facility]  SDA 1/11  pt instructed to take metoprolol the morning of surgery  instructions provided re antibacterial showers and pt given 3 sponges  pt instructed on use of incentive spirometer and demonstrated use to 1700cc  PCI of LCX to be performed post surgery

## 2021-01-06 NOTE — H&P ADULT - NSICDXPASTSURGICALHX_GEN_ALL_CORE_FT
PAST SURGICAL HISTORY:  Cataract L cataract surgery 6/09    H/O knee surgery     Previous back surgery

## 2021-01-06 NOTE — H&P ADULT - NSHPLABSRESULTS_GEN_ALL_CORE
Hgb A1C =  creat =  hct=  hgb=  plt=  WBC=  INR=  tot alb=  tot bili=    TSH=    Chest xray:     EKG:    Carotid US:    PFT's:    Echo:    Cardiac Cath: Hgb A1C = 8.1  creat = 0.81  hct= 40.2  hgb= 13.2  plt= 270  WBC= 11.56  INR= 1.02  tot alb= 4.2  tot bili= 1.0    TSH= 1.852    1/5/21 Chest xray: bilateral lower lung zone linear foci of atelectasis.     1/5/21 EKG: SR, 80 bpm    1/5/21 Carotid US: no hemodynamically significant stenosis of the bilateral internal carotid arteries    1/5/21 Cardiac Cath: 3 vessel CAD. 50% dLM, 905 ostial LAD, 90%mLAD, 90% p LCX, 50% ISR of OM2 iFR 0.93, LVEF 50%, LVEDP 11mmHG

## 2021-01-06 NOTE — H&P ADULT - HISTORY OF PRESENT ILLNESS
69 y/o male, former smoker, w/ PMHx  of known CAD (s/p PCI of OM2, 12 years ago), HTN, HLD, and type II DM. He  presented to cardiologist, Dr. Jerson Sheth, with c/o NAVARRO w/ ambulation of one-two blocks, resolved w/ rest and associated w/ chest tightness which subsequently resolves w/ rest. Patient denied palpitations, syncope, PND/orthopnea, LE edema, fever, chills, N/V/D, URI symptoms, cough, recent illness, travel, or sick contacts. NST 12/10/2020 revealed EF 50%, apical septal wall ischemia and inferior wall infarct w/ residual ischemia identified. Per MD note, Echocardiogram revealed EF 55%, LVH, dilated LA, abnormal LV compliance, and mild MR .In light of patient's risk factors, CCS III anginal symptoms, known CAD, and abnormal NST patient is referred for cardiac catheterization w/ possible intervention if clinically indicated. 1/5/21 Cardiac cath revealed 3 vessel CAD. Dr. Acuna consulted for evaluation for midcab as part of hybrid procedure. Patient now presents for elective surgery.          69 y/o male, former smoker, w/ PMHx  of known CAD (s/p PCI of OM2, 12 years ago), HTN, HLD, gout, and type II DM. He  presented to cardiologist, Dr. Jerson Sheth, with c/o NAVARRO w/ ambulation of one-two blocks, resolved w/ rest and associated w/ chest tightness which subsequently resolves w/ rest. Patient denied palpitations, syncope, PND/orthopnea, LE edema, fever, chills, N/V/D, URI symptoms, cough, recent illness, travel, or sick contacts. NST 12/10/2020 revealed EF 50%, apical septal wall ischemia and inferior wall infarct w/ residual ischemia identified. Per MD note, Echocardiogram revealed EF 55%, LVH, dilated LA, abnormal LV compliance, and mild MR .In light of patient's risk factors, CCS III anginal symptoms, known CAD, and abnormal NST patient is referred for cardiac catheterization w/ possible intervention if clinically indicated. 1/5/21 Cardiac cath revealed 3 vessel CAD. Dr. Acuna consulted for evaluation for midcab as part of hybrid procedure. Patient now presents for elective surgery.       Patient seen in same day holding area; Reports no changes to PMHx or medications since last seen by our team. Denies acute or current SOB, chest pain, palpitation, N/V/D, fever/chills, recent illness, or any other concerning symptoms. Pt reports nothing to eat or drink since last night. Pt last took his beta blocker this morning

## 2021-01-08 ENCOUNTER — APPOINTMENT (OUTPATIENT)
Dept: DISASTER EMERGENCY | Facility: CLINIC | Age: 71
End: 2021-01-08

## 2021-01-08 RX ORDER — ALLOPURINOL 300 MG
1 TABLET ORAL
Qty: 0 | Refills: 0 | DISCHARGE

## 2021-01-08 NOTE — PATIENT PROFILE ADULT - NSPROIMPLANTSMEDDEV_GEN_A_NUR
right knee  cardiac stent/Artificial joint right knee  cardiac stent  Titanium  (neck surgery)/Artificial joint right knee replacement  cardiac stent  Titanium  (neck surgery)/Artificial joint

## 2021-01-10 ENCOUNTER — TRANSCRIPTION ENCOUNTER (OUTPATIENT)
Age: 71
End: 2021-01-10

## 2021-01-10 LAB — SARS-COV-2 N GENE NPH QL NAA+PROBE: NOT DETECTED

## 2021-01-11 ENCOUNTER — INPATIENT (INPATIENT)
Facility: HOSPITAL | Age: 71
LOS: 2 days | Discharge: HOME | DRG: 236 | End: 2021-01-14
Attending: THORACIC SURGERY (CARDIOTHORACIC VASCULAR SURGERY) | Admitting: THORACIC SURGERY (CARDIOTHORACIC VASCULAR SURGERY)
Payer: MEDICARE

## 2021-01-11 ENCOUNTER — APPOINTMENT (OUTPATIENT)
Dept: CARDIOTHORACIC SURGERY | Facility: HOSPITAL | Age: 71
End: 2021-01-11

## 2021-01-11 DIAGNOSIS — Z98.890 OTHER SPECIFIED POSTPROCEDURAL STATES: Chronic | ICD-10-CM

## 2021-01-11 LAB
ALBUMIN SERPL ELPH-MCNC: 3.3 G/DL — SIGNIFICANT CHANGE UP (ref 3.3–5)
ALP SERPL-CCNC: 84 U/L — SIGNIFICANT CHANGE UP (ref 40–120)
ALT FLD-CCNC: 14 U/L — SIGNIFICANT CHANGE UP (ref 10–45)
ANION GAP SERPL CALC-SCNC: 12 MMOL/L — SIGNIFICANT CHANGE UP (ref 5–17)
ANION GAP SERPL CALC-SCNC: 13 MMOL/L — SIGNIFICANT CHANGE UP (ref 5–17)
APTT BLD: 29.2 SEC — SIGNIFICANT CHANGE UP (ref 27.5–35.5)
APTT BLD: 35.9 SEC — HIGH (ref 27.5–35.5)
AST SERPL-CCNC: 16 U/L — SIGNIFICANT CHANGE UP (ref 10–40)
BASOPHILS # BLD AUTO: 0.07 K/UL — SIGNIFICANT CHANGE UP (ref 0–0.2)
BASOPHILS NFR BLD AUTO: 0.4 % — SIGNIFICANT CHANGE UP (ref 0–2)
BILIRUB SERPL-MCNC: 0.9 MG/DL — SIGNIFICANT CHANGE UP (ref 0.2–1.2)
BLD GP AB SCN SERPL QL: NEGATIVE — SIGNIFICANT CHANGE UP
BUN SERPL-MCNC: 10 MG/DL — SIGNIFICANT CHANGE UP (ref 7–23)
BUN SERPL-MCNC: 8 MG/DL — SIGNIFICANT CHANGE UP (ref 7–23)
CALCIUM SERPL-MCNC: 10 MG/DL — SIGNIFICANT CHANGE UP (ref 8.4–10.5)
CALCIUM SERPL-MCNC: 8.3 MG/DL — LOW (ref 8.4–10.5)
CHLORIDE SERPL-SCNC: 105 MMOL/L — SIGNIFICANT CHANGE UP (ref 96–108)
CHLORIDE SERPL-SCNC: 105 MMOL/L — SIGNIFICANT CHANGE UP (ref 96–108)
CO2 SERPL-SCNC: 21 MMOL/L — LOW (ref 22–31)
CO2 SERPL-SCNC: 22 MMOL/L — SIGNIFICANT CHANGE UP (ref 22–31)
CREAT SERPL-MCNC: 0.7 MG/DL — SIGNIFICANT CHANGE UP (ref 0.5–1.3)
CREAT SERPL-MCNC: 0.7 MG/DL — SIGNIFICANT CHANGE UP (ref 0.5–1.3)
EOSINOPHIL # BLD AUTO: 0.07 K/UL — SIGNIFICANT CHANGE UP (ref 0–0.5)
EOSINOPHIL NFR BLD AUTO: 0.4 % — SIGNIFICANT CHANGE UP (ref 0–6)
GAS PNL BLDA: SIGNIFICANT CHANGE UP
GLUCOSE BLDC GLUCOMTR-MCNC: 150 MG/DL — HIGH (ref 70–99)
GLUCOSE BLDC GLUCOMTR-MCNC: 162 MG/DL — HIGH (ref 70–99)
GLUCOSE BLDC GLUCOMTR-MCNC: 162 MG/DL — HIGH (ref 70–99)
GLUCOSE BLDC GLUCOMTR-MCNC: 168 MG/DL — HIGH (ref 70–99)
GLUCOSE BLDC GLUCOMTR-MCNC: 169 MG/DL — HIGH (ref 70–99)
GLUCOSE BLDC GLUCOMTR-MCNC: 173 MG/DL — HIGH (ref 70–99)
GLUCOSE SERPL-MCNC: 169 MG/DL — HIGH (ref 70–99)
GLUCOSE SERPL-MCNC: 202 MG/DL — HIGH (ref 70–99)
HCT VFR BLD CALC: 36.3 % — LOW (ref 39–50)
HCT VFR BLD CALC: 38.8 % — LOW (ref 39–50)
HGB BLD-MCNC: 12.1 G/DL — LOW (ref 13–17)
HGB BLD-MCNC: 12.7 G/DL — LOW (ref 13–17)
IMM GRANULOCYTES NFR BLD AUTO: 1 % — SIGNIFICANT CHANGE UP (ref 0–1.5)
INR BLD: 1.09 — SIGNIFICANT CHANGE UP (ref 0.88–1.16)
INR BLD: 1.21 — HIGH (ref 0.88–1.16)
LYMPHOCYTES # BLD AUTO: 1.12 K/UL — SIGNIFICANT CHANGE UP (ref 1–3.3)
LYMPHOCYTES # BLD AUTO: 6.2 % — LOW (ref 13–44)
MAGNESIUM SERPL-MCNC: 1.4 MG/DL — LOW (ref 1.6–2.6)
MAGNESIUM SERPL-MCNC: 2.8 MG/DL — HIGH (ref 1.6–2.6)
MCHC RBC-ENTMCNC: 26 PG — LOW (ref 27–34)
MCHC RBC-ENTMCNC: 26.8 PG — LOW (ref 27–34)
MCHC RBC-ENTMCNC: 32.7 GM/DL — SIGNIFICANT CHANGE UP (ref 32–36)
MCHC RBC-ENTMCNC: 33.3 GM/DL — SIGNIFICANT CHANGE UP (ref 32–36)
MCV RBC AUTO: 79.5 FL — LOW (ref 80–100)
MCV RBC AUTO: 80.5 FL — SIGNIFICANT CHANGE UP (ref 80–100)
MONOCYTES # BLD AUTO: 1.02 K/UL — HIGH (ref 0–0.9)
MONOCYTES NFR BLD AUTO: 5.6 % — SIGNIFICANT CHANGE UP (ref 2–14)
NEUTROPHILS # BLD AUTO: 15.74 K/UL — HIGH (ref 1.8–7.4)
NEUTROPHILS NFR BLD AUTO: 86.4 % — HIGH (ref 43–77)
NRBC # BLD: 0 /100 WBCS — SIGNIFICANT CHANGE UP (ref 0–0)
NRBC # BLD: 0 /100 WBCS — SIGNIFICANT CHANGE UP (ref 0–0)
PHOSPHATE SERPL-MCNC: 2.4 MG/DL — LOW (ref 2.5–4.5)
PHOSPHATE SERPL-MCNC: 4.3 MG/DL — SIGNIFICANT CHANGE UP (ref 2.5–4.5)
PLATELET # BLD AUTO: 239 K/UL — SIGNIFICANT CHANGE UP (ref 150–400)
PLATELET # BLD AUTO: 283 K/UL — SIGNIFICANT CHANGE UP (ref 150–400)
POTASSIUM SERPL-MCNC: 4.3 MMOL/L — SIGNIFICANT CHANGE UP (ref 3.5–5.3)
POTASSIUM SERPL-MCNC: 4.7 MMOL/L — SIGNIFICANT CHANGE UP (ref 3.5–5.3)
POTASSIUM SERPL-SCNC: 4.3 MMOL/L — SIGNIFICANT CHANGE UP (ref 3.5–5.3)
POTASSIUM SERPL-SCNC: 4.7 MMOL/L — SIGNIFICANT CHANGE UP (ref 3.5–5.3)
PROT SERPL-MCNC: 6.1 G/DL — SIGNIFICANT CHANGE UP (ref 6–8.3)
PROTHROM AB SERPL-ACNC: 13 SEC — SIGNIFICANT CHANGE UP (ref 10.6–13.6)
PROTHROM AB SERPL-ACNC: 14.4 SEC — HIGH (ref 10.6–13.6)
RBC # BLD: 4.51 M/UL — SIGNIFICANT CHANGE UP (ref 4.2–5.8)
RBC # BLD: 4.88 M/UL — SIGNIFICANT CHANGE UP (ref 4.2–5.8)
RBC # FLD: 15.2 % — HIGH (ref 10.3–14.5)
RBC # FLD: 15.6 % — HIGH (ref 10.3–14.5)
RH IG SCN BLD-IMP: POSITIVE — SIGNIFICANT CHANGE UP
SODIUM SERPL-SCNC: 139 MMOL/L — SIGNIFICANT CHANGE UP (ref 135–145)
SODIUM SERPL-SCNC: 139 MMOL/L — SIGNIFICANT CHANGE UP (ref 135–145)
WBC # BLD: 18.21 K/UL — HIGH (ref 3.8–10.5)
WBC # BLD: 21.22 K/UL — HIGH (ref 3.8–10.5)
WBC # FLD AUTO: 18.21 K/UL — HIGH (ref 3.8–10.5)
WBC # FLD AUTO: 21.22 K/UL — HIGH (ref 3.8–10.5)

## 2021-01-11 PROCEDURE — 99292 CRITICAL CARE ADDL 30 MIN: CPT

## 2021-01-11 PROCEDURE — 99291 CRITICAL CARE FIRST HOUR: CPT

## 2021-01-11 PROCEDURE — 76998 US GUIDE INTRAOP: CPT | Mod: 26,59

## 2021-01-11 PROCEDURE — 33533 CABG ARTERIAL SINGLE: CPT

## 2021-01-11 PROCEDURE — 33533 CABG ARTERIAL SINGLE: CPT | Mod: AS

## 2021-01-11 PROCEDURE — 71045 X-RAY EXAM CHEST 1 VIEW: CPT | Mod: 26

## 2021-01-11 RX ORDER — OXYCODONE AND ACETAMINOPHEN 5; 325 MG/1; MG/1
1 TABLET ORAL EVERY 4 HOURS
Refills: 0 | Status: DISCONTINUED | OUTPATIENT
Start: 2021-01-11 | End: 2021-01-14

## 2021-01-11 RX ORDER — PANTOPRAZOLE SODIUM 20 MG/1
40 TABLET, DELAYED RELEASE ORAL DAILY
Refills: 0 | Status: DISCONTINUED | OUTPATIENT
Start: 2021-01-11 | End: 2021-01-11

## 2021-01-11 RX ORDER — ACETAMINOPHEN 500 MG
650 TABLET ORAL EVERY 6 HOURS
Refills: 0 | Status: DISCONTINUED | OUTPATIENT
Start: 2021-01-11 | End: 2021-01-14

## 2021-01-11 RX ORDER — ACETAMINOPHEN 500 MG
1000 TABLET ORAL ONCE
Refills: 0 | Status: COMPLETED | OUTPATIENT
Start: 2021-01-11 | End: 2021-01-11

## 2021-01-11 RX ORDER — MAGNESIUM SULFATE 500 MG/ML
2 VIAL (ML) INJECTION
Refills: 0 | Status: COMPLETED | OUTPATIENT
Start: 2021-01-11 | End: 2021-01-11

## 2021-01-11 RX ORDER — ASPIRIN/CALCIUM CARB/MAGNESIUM 324 MG
81 TABLET ORAL DAILY
Refills: 0 | Status: DISCONTINUED | OUTPATIENT
Start: 2021-01-11 | End: 2021-01-14

## 2021-01-11 RX ORDER — CEFAZOLIN SODIUM 1 G
2000 VIAL (EA) INJECTION EVERY 8 HOURS
Refills: 0 | Status: COMPLETED | OUTPATIENT
Start: 2021-01-11 | End: 2021-01-13

## 2021-01-11 RX ORDER — ALBUMIN HUMAN 25 %
250 VIAL (ML) INTRAVENOUS ONCE
Refills: 0 | Status: COMPLETED | OUTPATIENT
Start: 2021-01-11 | End: 2021-01-11

## 2021-01-11 RX ORDER — ALLOPURINOL 300 MG
100 TABLET ORAL DAILY
Refills: 0 | Status: DISCONTINUED | OUTPATIENT
Start: 2021-01-11 | End: 2021-01-14

## 2021-01-11 RX ORDER — PANTOPRAZOLE SODIUM 20 MG/1
40 TABLET, DELAYED RELEASE ORAL
Refills: 0 | Status: DISCONTINUED | OUTPATIENT
Start: 2021-01-11 | End: 2021-01-14

## 2021-01-11 RX ORDER — OXYCODONE AND ACETAMINOPHEN 5; 325 MG/1; MG/1
2 TABLET ORAL EVERY 6 HOURS
Refills: 0 | Status: DISCONTINUED | OUTPATIENT
Start: 2021-01-11 | End: 2021-01-14

## 2021-01-11 RX ORDER — CHLORHEXIDINE GLUCONATE 213 G/1000ML
1 SOLUTION TOPICAL
Refills: 0 | Status: DISCONTINUED | OUTPATIENT
Start: 2021-01-11 | End: 2021-01-14

## 2021-01-11 RX ORDER — POLYETHYLENE GLYCOL 3350 17 G/17G
17 POWDER, FOR SOLUTION ORAL DAILY
Refills: 0 | Status: DISCONTINUED | OUTPATIENT
Start: 2021-01-11 | End: 2021-01-14

## 2021-01-11 RX ORDER — HEPARIN SODIUM 5000 [USP'U]/ML
5000 INJECTION INTRAVENOUS; SUBCUTANEOUS EVERY 8 HOURS
Refills: 0 | Status: DISCONTINUED | OUTPATIENT
Start: 2021-01-11 | End: 2021-01-14

## 2021-01-11 RX ORDER — CALCIUM GLUCONATE 100 MG/ML
2 VIAL (ML) INTRAVENOUS ONCE
Refills: 0 | Status: COMPLETED | OUTPATIENT
Start: 2021-01-11 | End: 2021-01-11

## 2021-01-11 RX ORDER — SENNA PLUS 8.6 MG/1
2 TABLET ORAL AT BEDTIME
Refills: 0 | Status: DISCONTINUED | OUTPATIENT
Start: 2021-01-11 | End: 2021-01-14

## 2021-01-11 RX ORDER — COLCHICINE 0.6 MG
1 TABLET ORAL
Qty: 0 | Refills: 0 | DISCHARGE

## 2021-01-11 RX ORDER — FENTANYL CITRATE 50 UG/ML
25 INJECTION INTRAVENOUS ONCE
Refills: 0 | Status: DISCONTINUED | OUTPATIENT
Start: 2021-01-11 | End: 2021-01-11

## 2021-01-11 RX ORDER — POTASSIUM PHOSPHATE, MONOBASIC POTASSIUM PHOSPHATE, DIBASIC 236; 224 MG/ML; MG/ML
15 INJECTION, SOLUTION INTRAVENOUS ONCE
Refills: 0 | Status: COMPLETED | OUTPATIENT
Start: 2021-01-11 | End: 2021-01-11

## 2021-01-11 RX ORDER — SODIUM CHLORIDE 9 MG/ML
1000 INJECTION INTRAMUSCULAR; INTRAVENOUS; SUBCUTANEOUS
Refills: 0 | Status: DISCONTINUED | OUTPATIENT
Start: 2021-01-11 | End: 2021-01-12

## 2021-01-11 RX ORDER — ATORVASTATIN CALCIUM 80 MG/1
40 TABLET, FILM COATED ORAL AT BEDTIME
Refills: 0 | Status: DISCONTINUED | OUTPATIENT
Start: 2021-01-11 | End: 2021-01-14

## 2021-01-11 RX ORDER — CLOPIDOGREL BISULFATE 75 MG/1
75 TABLET, FILM COATED ORAL DAILY
Refills: 0 | Status: DISCONTINUED | OUTPATIENT
Start: 2021-01-11 | End: 2021-01-14

## 2021-01-11 RX ORDER — DEXTROSE 50 % IN WATER 50 %
25 SYRINGE (ML) INTRAVENOUS
Refills: 0 | Status: DISCONTINUED | OUTPATIENT
Start: 2021-01-11 | End: 2021-01-12

## 2021-01-11 RX ORDER — ALLOPURINOL 300 MG
0 TABLET ORAL
Qty: 0 | Refills: 0 | DISCHARGE

## 2021-01-11 RX ORDER — BUPIVACAINE 13.3 MG/ML
20 INJECTION, SUSPENSION, LIPOSOMAL INFILTRATION ONCE
Refills: 0 | Status: DISCONTINUED | OUTPATIENT
Start: 2021-01-11 | End: 2021-01-11

## 2021-01-11 RX ORDER — DEXTROSE 50 % IN WATER 50 %
50 SYRINGE (ML) INTRAVENOUS
Refills: 0 | Status: DISCONTINUED | OUTPATIENT
Start: 2021-01-11 | End: 2021-01-12

## 2021-01-11 RX ORDER — NICARDIPINE HYDROCHLORIDE 30 MG/1
5 CAPSULE, EXTENDED RELEASE ORAL
Qty: 40 | Refills: 0 | Status: DISCONTINUED | OUTPATIENT
Start: 2021-01-11 | End: 2021-01-11

## 2021-01-11 RX ORDER — INSULIN HUMAN 100 [IU]/ML
1 INJECTION, SOLUTION SUBCUTANEOUS
Qty: 100 | Refills: 0 | Status: DISCONTINUED | OUTPATIENT
Start: 2021-01-11 | End: 2021-01-12

## 2021-01-11 RX ADMIN — FENTANYL CITRATE 25 MICROGRAM(S): 50 INJECTION INTRAVENOUS at 18:53

## 2021-01-11 RX ADMIN — NICARDIPINE HYDROCHLORIDE 25 MG/HR: 30 CAPSULE, EXTENDED RELEASE ORAL at 18:53

## 2021-01-11 RX ADMIN — Medication 125 MILLILITER(S): at 23:26

## 2021-01-11 RX ADMIN — INSULIN HUMAN 1 UNIT(S)/HR: 100 INJECTION, SOLUTION SUBCUTANEOUS at 18:55

## 2021-01-11 RX ADMIN — Medication 50 GRAM(S): at 20:29

## 2021-01-11 RX ADMIN — Medication 50 GRAM(S): at 18:53

## 2021-01-11 RX ADMIN — POTASSIUM PHOSPHATE, MONOBASIC POTASSIUM PHOSPHATE, DIBASIC 62.5 MILLIMOLE(S): 236; 224 INJECTION, SOLUTION INTRAVENOUS at 18:55

## 2021-01-11 RX ADMIN — HEPARIN SODIUM 5000 UNIT(S): 5000 INJECTION INTRAVENOUS; SUBCUTANEOUS at 21:47

## 2021-01-11 RX ADMIN — Medication 400 MILLIGRAM(S): at 23:26

## 2021-01-11 RX ADMIN — Medication 200 GRAM(S): at 20:47

## 2021-01-11 RX ADMIN — Medication 100 MILLIGRAM(S): at 21:47

## 2021-01-11 NOTE — BRIEF OPERATIVE NOTE - NSICDXBRIEFPROCEDURE_GEN_ALL_CORE_FT
PROCEDURES:  Minimally invasive direct coronary artery bypass (MIDCAB) with transesophageal echocardiography (YANETH) 11-Jan-2021 17:45:10  Karen Pollock

## 2021-01-11 NOTE — BRIEF OPERATIVE NOTE - COMMENTS
I first assisted for the entirety of the case, including but not limited to robotic instrumentation,  distal anastomoses, and closure.

## 2021-01-11 NOTE — PROGRESS NOTE ADULT - SUBJECTIVE AND OBJECTIVE BOX
CTICU  CRITICAL  CARE  attending     Hand off received 					   Pertinent clinical, laboratory, radiographic, hemodynamic, echocardiographic, respiratory data, microbiologic data and chart were reviewed and analyzed frequently throughout the course of the day and night  Patient seen and examined with CTS/ SH attending at bedside    Pt is a 70y , Male, s/p CABG x 1; today; robotic assisted midCAB;    arrived intubated  on IV nicardipine infusion  Weaned and extubated  SBP 's    Plan hybrid-PCI in 6 weeks      HPI:    69 y/o male, former smoker, w/ PMHx  of known CAD (s/p PCI of OM2, 12 years ago), HTN, HLD, gout, and type II DM. He  presented to cardiologist, Dr. Jerson Sheth, with c/o NAVARRO w/ ambulation of one-two blocks, resolved w/ rest and associated w/ chest tightness which subsequently resolves w/ rest. Patient denied palpitations, syncope, PND/orthopnea, LE edema, fever, chills, N/V/D, URI symptoms, cough, recent illness, travel, or sick contacts. NST 12/10/2020 revealed EF 50%, apical septal wall ischemia and inferior wall infarct w/ residual ischemia identified. Per MD note, Echocardiogram revealed EF 55%, LVH, dilated LA, abnormal LV compliance, and mild MR .In light of patient's risk factors, CCS III anginal symptoms, known CAD, and abnormal NST patient is referred for cardiac catheterization w/ possible intervention if clinically indicated. 1/5/21 Cardiac cath revealed 3 vessel CAD.        , FAMILY HISTORY:  No pertinent family history in first degree relatives    PAST MEDICAL & SURGICAL HISTORY:  Coronary artery disease    Diabetes    Hyperlipidemia    Hypertension    Gout    Previous back surgery    H/O knee surgery    Cataract  L cataract surgery 6/09      Patient is a 70y old  Male who presents with a chief complaint of CAD (06 Jan 2021 14:05)      14 system review was unremarkable    Vital signs, hemodynamic and respiratory parameters were reviewed from the bedside nursing flowsheet.  ICU Vital Signs Last 24 Hrs  T(C): 36.3 (11 Jan 2021 21:06), Max: 36.3 (11 Jan 2021 21:06)  T(F): 97.4 (11 Jan 2021 21:06), Max: 97.4 (11 Jan 2021 21:06)  HR: 95 (11 Jan 2021 22:00) (80 - 95)  BP: --  BP(mean): --  ABP: 131/68 (11 Jan 2021 22:00) (98/56 - 161/88)  ABP(mean): 90 (11 Jan 2021 22:00) (71 - 117)  RR: 16 (11 Jan 2021 22:00) (12 - 19)  SpO2: 95% (11 Jan 2021 22:00) (94% - 100%)    Adult Advanced Hemodynamics Last 24 Hrs  CVP(mm Hg): 11 (11 Jan 2021 22:00) (10 - 19)  CVP(cm H2O): --  CO: --  CI: --  PA: --  PA(mean): --  PCWP: --  SVR: --  SVRI: --  PVR: --  PVRI: --, ABG - ( 11 Jan 2021 23:26 )  pH, Arterial: 7.39  pH, Blood: x     /  pCO2: 39    /  pO2: 82    / HCO3: 23    / Base Excess: -1.7  /  SaO2: 96                Mode: CPAP with PS  FiO2: 40  PEEP: 5  PS: 10  MAP: 9  PIP: 15    Intake and output was reviewed and the fluid balance was calculated  Daily     Daily   I&O's Summary    11 Jan 2021 07:01  -  11 Jan 2021 23:48  --------------------------------------------------------  IN: 767 mL / OUT: 805 mL / NET: -38 mL        All lines and drain sites were assessed  [facility]      POCT Blood Glucose.: 150 mg/dL (11 Jan 2021 23:13)    No acute change in mental status  Auscultation of the chest reveals equal bs  Abdomen is soft  Extremities are warm and well perfused  Wounds appear clean and unremarkable  Antibiotics are periop    labs  CBC Full  -  ( 11 Jan 2021 21:35 )  WBC Count : 21.22 K/uL  RBC Count : 4.88 M/uL  Hemoglobin : 12.7 g/dL  Hematocrit : 38.8 %  Platelet Count - Automated : 283 K/uL  Mean Cell Volume : 79.5 fl  Mean Cell Hemoglobin : 26.0 pg  Mean Cell Hemoglobin Concentration : 32.7 gm/dL  Auto Neutrophil # : x  Auto Lymphocyte # : x  Auto Monocyte # : x  Auto Eosinophil # : x  Auto Basophil # : x  Auto Neutrophil % : x  Auto Lymphocyte % : x  Auto Monocyte % : x  Auto Eosinophil % : x  Auto Basophil % : x    01-11    139  |  105  |  10  ----------------------------<  202<H>  4.7   |  21<L>  |  0.70    Ca    10.0      11 Jan 2021 21:35  Phos  4.3     01-11  Mg     2.8     01-11    TPro  6.1  /  Alb  3.3  /  TBili  0.9  /  DBili  x   /  AST  16  /  ALT  14  /  AlkPhos  84  01-11    PT/INR - ( 11 Jan 2021 21:35 )   PT: 13.0 sec;   INR: 1.09          PTT - ( 11 Jan 2021 21:35 )  PTT:35.9 sec  The current medications were reviewed   MEDICATIONS  (STANDING):  allopurinol 100 milliGRAM(s) Oral daily  aspirin enteric coated 81 milliGRAM(s) Oral daily  atorvastatin 40 milliGRAM(s) Oral at bedtime  ceFAZolin   IVPB 2000 milliGRAM(s) IV Intermittent every 8 hours  chlorhexidine 2% Cloths 1 Application(s) Topical <User Schedule>  clopidogrel Tablet 75 milliGRAM(s) Oral daily  dextrose 50% Injectable 50 milliLiter(s) IV Push every 15 minutes  dextrose 50% Injectable 25 milliLiter(s) IV Push every 15 minutes  heparin   Injectable 5000 Unit(s) SubCutaneous every 8 hours  insulin regular Infusion 1 Unit(s)/Hr (1 mL/Hr) IV Continuous <Continuous>  niCARdipine Infusion 5 mG/Hr (25 mL/Hr) IV Continuous <Continuous>  polyethylene glycol 3350 17 Gram(s) Oral daily  senna 2 Tablet(s) Oral at bedtime  sodium chloride 0.9%. 1000 milliLiter(s) (10 mL/Hr) IV Continuous <Continuous>    MEDICATIONS  (PRN):  acetaminophen   Tablet .. 650 milliGRAM(s) Oral every 6 hours PRN Mild Pain (1 - 3)  oxycodone    5 mG/acetaminophen 325 mG 1 Tablet(s) Oral every 4 hours PRN Moderate Pain (4 - 6)  oxycodone    5 mG/acetaminophen 325 mG 2 Tablet(s) Oral every 6 hours PRN Severe Pain (7 - 10)       PROBLEM LIST/ ASSESSMENT:  HEALTH ISSUES - PROBLEM Dx:      ,   Patient is a 70y old  Male who presents with a chief complaint of CAD (06 Jan 2021 14:05)     s/p CABG      My plan includes :  close hemodynamic, ventilatory and drain monitoring and management per post op routine    Monitor for arrhythmias and monitor parameters for organ perfusion  monitor neurologic status  Head of the bed should remain elevated to 45 deg .   chest PT and IS will be encouraged  monitor adequacy of oxygenation and ventilation and attempt to wean oxygen  Nutritional goals will be met using po eventually , ensure adequate caloric intake and montior the same  Stress ulcer and VTE prophylaxis will be achieved    Glycemic control is satisfactory  Electrolytes have been repleted as necessary and wound care has been carried out. Pain control has been achieved.   agressive physical therapy and early mobility and ambulation goals will be met   The family was updated about the course and plan  CRITICAL CARE TIME SPENT in evaluation and management, reassessments, review and interpretation of labs and x-rays, ventilator and hemodynamic management, formulating a plan and coordinating care: ___90____ MIN.  Time does not include procedural time.  CTICU ATTENDING     					    Tee Menchaca MD

## 2021-01-12 LAB
ALBUMIN SERPL ELPH-MCNC: 3.6 G/DL — SIGNIFICANT CHANGE UP (ref 3.3–5)
ALP SERPL-CCNC: 86 U/L — SIGNIFICANT CHANGE UP (ref 40–120)
ALT FLD-CCNC: 14 U/L — SIGNIFICANT CHANGE UP (ref 10–45)
ANION GAP SERPL CALC-SCNC: 12 MMOL/L — SIGNIFICANT CHANGE UP (ref 5–17)
ANION GAP SERPL CALC-SCNC: 12 MMOL/L — SIGNIFICANT CHANGE UP (ref 5–17)
APTT BLD: 28.6 SEC — SIGNIFICANT CHANGE UP (ref 27.5–35.5)
APTT BLD: 32.6 SEC — SIGNIFICANT CHANGE UP (ref 27.5–35.5)
AST SERPL-CCNC: 21 U/L — SIGNIFICANT CHANGE UP (ref 10–40)
BILIRUB SERPL-MCNC: 1.4 MG/DL — HIGH (ref 0.2–1.2)
BUN SERPL-MCNC: 9 MG/DL — SIGNIFICANT CHANGE UP (ref 7–23)
BUN SERPL-MCNC: 9 MG/DL — SIGNIFICANT CHANGE UP (ref 7–23)
CALCIUM SERPL-MCNC: 8.8 MG/DL — SIGNIFICANT CHANGE UP (ref 8.4–10.5)
CALCIUM SERPL-MCNC: 8.9 MG/DL — SIGNIFICANT CHANGE UP (ref 8.4–10.5)
CHLORIDE SERPL-SCNC: 102 MMOL/L — SIGNIFICANT CHANGE UP (ref 96–108)
CHLORIDE SERPL-SCNC: 104 MMOL/L — SIGNIFICANT CHANGE UP (ref 96–108)
CO2 SERPL-SCNC: 21 MMOL/L — LOW (ref 22–31)
CO2 SERPL-SCNC: 22 MMOL/L — SIGNIFICANT CHANGE UP (ref 22–31)
CREAT SERPL-MCNC: 0.84 MG/DL — SIGNIFICANT CHANGE UP (ref 0.5–1.3)
CREAT SERPL-MCNC: 0.88 MG/DL — SIGNIFICANT CHANGE UP (ref 0.5–1.3)
GAS PNL BLDA: SIGNIFICANT CHANGE UP
GAS PNL BLDA: SIGNIFICANT CHANGE UP
GLUCOSE BLDC GLUCOMTR-MCNC: 117 MG/DL — HIGH (ref 70–99)
GLUCOSE BLDC GLUCOMTR-MCNC: 122 MG/DL — HIGH (ref 70–99)
GLUCOSE BLDC GLUCOMTR-MCNC: 134 MG/DL — HIGH (ref 70–99)
GLUCOSE BLDC GLUCOMTR-MCNC: 168 MG/DL — HIGH (ref 70–99)
GLUCOSE BLDC GLUCOMTR-MCNC: 204 MG/DL — HIGH (ref 70–99)
GLUCOSE BLDC GLUCOMTR-MCNC: 263 MG/DL — HIGH (ref 70–99)
GLUCOSE BLDC GLUCOMTR-MCNC: 88 MG/DL — SIGNIFICANT CHANGE UP (ref 70–99)
GLUCOSE BLDC GLUCOMTR-MCNC: 90 MG/DL — SIGNIFICANT CHANGE UP (ref 70–99)
GLUCOSE SERPL-MCNC: 228 MG/DL — HIGH (ref 70–99)
GLUCOSE SERPL-MCNC: 89 MG/DL — SIGNIFICANT CHANGE UP (ref 70–99)
HCT VFR BLD CALC: 33.4 % — LOW (ref 39–50)
HCT VFR BLD CALC: 37 % — LOW (ref 39–50)
HGB BLD-MCNC: 11 G/DL — LOW (ref 13–17)
HGB BLD-MCNC: 12 G/DL — LOW (ref 13–17)
INR BLD: 1.11 — SIGNIFICANT CHANGE UP (ref 0.88–1.16)
INR BLD: 1.13 — SIGNIFICANT CHANGE UP (ref 0.88–1.16)
MAGNESIUM SERPL-MCNC: 1.9 MG/DL — SIGNIFICANT CHANGE UP (ref 1.6–2.6)
MAGNESIUM SERPL-MCNC: 2.2 MG/DL — SIGNIFICANT CHANGE UP (ref 1.6–2.6)
MCHC RBC-ENTMCNC: 25.8 PG — LOW (ref 27–34)
MCHC RBC-ENTMCNC: 26.4 PG — LOW (ref 27–34)
MCHC RBC-ENTMCNC: 32.4 GM/DL — SIGNIFICANT CHANGE UP (ref 32–36)
MCHC RBC-ENTMCNC: 32.9 GM/DL — SIGNIFICANT CHANGE UP (ref 32–36)
MCV RBC AUTO: 79.6 FL — LOW (ref 80–100)
MCV RBC AUTO: 80.1 FL — SIGNIFICANT CHANGE UP (ref 80–100)
NRBC # BLD: 0 /100 WBCS — SIGNIFICANT CHANGE UP (ref 0–0)
NRBC # BLD: 0 /100 WBCS — SIGNIFICANT CHANGE UP (ref 0–0)
PHOSPHATE SERPL-MCNC: 3.6 MG/DL — SIGNIFICANT CHANGE UP (ref 2.5–4.5)
PHOSPHATE SERPL-MCNC: 3.9 MG/DL — SIGNIFICANT CHANGE UP (ref 2.5–4.5)
PLATELET # BLD AUTO: 244 K/UL — SIGNIFICANT CHANGE UP (ref 150–400)
PLATELET # BLD AUTO: 274 K/UL — SIGNIFICANT CHANGE UP (ref 150–400)
POTASSIUM SERPL-MCNC: 4.4 MMOL/L — SIGNIFICANT CHANGE UP (ref 3.5–5.3)
POTASSIUM SERPL-MCNC: 4.6 MMOL/L — SIGNIFICANT CHANGE UP (ref 3.5–5.3)
POTASSIUM SERPL-SCNC: 4.4 MMOL/L — SIGNIFICANT CHANGE UP (ref 3.5–5.3)
POTASSIUM SERPL-SCNC: 4.6 MMOL/L — SIGNIFICANT CHANGE UP (ref 3.5–5.3)
PROT SERPL-MCNC: 6.8 G/DL — SIGNIFICANT CHANGE UP (ref 6–8.3)
PROTHROM AB SERPL-ACNC: 13.3 SEC — SIGNIFICANT CHANGE UP (ref 10.6–13.6)
PROTHROM AB SERPL-ACNC: 13.5 SEC — SIGNIFICANT CHANGE UP (ref 10.6–13.6)
RBC # BLD: 4.17 M/UL — LOW (ref 4.2–5.8)
RBC # BLD: 4.65 M/UL — SIGNIFICANT CHANGE UP (ref 4.2–5.8)
RBC # FLD: 15.5 % — HIGH (ref 10.3–14.5)
RBC # FLD: 15.8 % — HIGH (ref 10.3–14.5)
SODIUM SERPL-SCNC: 136 MMOL/L — SIGNIFICANT CHANGE UP (ref 135–145)
SODIUM SERPL-SCNC: 137 MMOL/L — SIGNIFICANT CHANGE UP (ref 135–145)
WBC # BLD: 15.03 K/UL — HIGH (ref 3.8–10.5)
WBC # BLD: 16.05 K/UL — HIGH (ref 3.8–10.5)
WBC # FLD AUTO: 15.03 K/UL — HIGH (ref 3.8–10.5)
WBC # FLD AUTO: 16.05 K/UL — HIGH (ref 3.8–10.5)

## 2021-01-12 PROCEDURE — 99291 CRITICAL CARE FIRST HOUR: CPT

## 2021-01-12 PROCEDURE — 99292 CRITICAL CARE ADDL 30 MIN: CPT

## 2021-01-12 PROCEDURE — 71045 X-RAY EXAM CHEST 1 VIEW: CPT | Mod: 26,76

## 2021-01-12 RX ORDER — DEXTROSE 50 % IN WATER 50 %
15 SYRINGE (ML) INTRAVENOUS ONCE
Refills: 0 | Status: DISCONTINUED | OUTPATIENT
Start: 2021-01-12 | End: 2021-01-12

## 2021-01-12 RX ORDER — METOPROLOL TARTRATE 50 MG
12.5 TABLET ORAL EVERY 12 HOURS
Refills: 0 | Status: DISCONTINUED | OUTPATIENT
Start: 2021-01-12 | End: 2021-01-12

## 2021-01-12 RX ORDER — DEXTROSE 50 % IN WATER 50 %
12.5 SYRINGE (ML) INTRAVENOUS ONCE
Refills: 0 | Status: DISCONTINUED | OUTPATIENT
Start: 2021-01-12 | End: 2021-01-14

## 2021-01-12 RX ORDER — SODIUM CHLORIDE 9 MG/ML
1000 INJECTION, SOLUTION INTRAVENOUS
Refills: 0 | Status: DISCONTINUED | OUTPATIENT
Start: 2021-01-12 | End: 2021-01-12

## 2021-01-12 RX ORDER — DEXTROSE 50 % IN WATER 50 %
25 SYRINGE (ML) INTRAVENOUS ONCE
Refills: 0 | Status: DISCONTINUED | OUTPATIENT
Start: 2021-01-12 | End: 2021-01-12

## 2021-01-12 RX ORDER — MAGNESIUM SULFATE 500 MG/ML
2 VIAL (ML) INJECTION ONCE
Refills: 0 | Status: COMPLETED | OUTPATIENT
Start: 2021-01-12 | End: 2021-01-12

## 2021-01-12 RX ORDER — FENTANYL CITRATE 50 UG/ML
25 INJECTION INTRAVENOUS ONCE
Refills: 0 | Status: DISCONTINUED | OUTPATIENT
Start: 2021-01-12 | End: 2021-01-14

## 2021-01-12 RX ORDER — ALBUMIN HUMAN 25 %
250 VIAL (ML) INTRAVENOUS ONCE
Refills: 0 | Status: COMPLETED | OUTPATIENT
Start: 2021-01-12 | End: 2021-01-12

## 2021-01-12 RX ORDER — CALCIUM GLUCONATE 100 MG/ML
2 VIAL (ML) INTRAVENOUS ONCE
Refills: 0 | Status: COMPLETED | OUTPATIENT
Start: 2021-01-12 | End: 2021-01-12

## 2021-01-12 RX ORDER — GLUCAGON INJECTION, SOLUTION 0.5 MG/.1ML
1 INJECTION, SOLUTION SUBCUTANEOUS ONCE
Refills: 0 | Status: DISCONTINUED | OUTPATIENT
Start: 2021-01-12 | End: 2021-01-12

## 2021-01-12 RX ORDER — METOPROLOL TARTRATE 50 MG
25 TABLET ORAL EVERY 6 HOURS
Refills: 0 | Status: DISCONTINUED | OUTPATIENT
Start: 2021-01-12 | End: 2021-01-14

## 2021-01-12 RX ORDER — INSULIN LISPRO 100/ML
4 VIAL (ML) SUBCUTANEOUS
Refills: 0 | Status: DISCONTINUED | OUTPATIENT
Start: 2021-01-12 | End: 2021-01-13

## 2021-01-12 RX ORDER — INSULIN LISPRO 100/ML
VIAL (ML) SUBCUTANEOUS
Refills: 0 | Status: DISCONTINUED | OUTPATIENT
Start: 2021-01-12 | End: 2021-01-14

## 2021-01-12 RX ORDER — INSULIN LISPRO 100/ML
VIAL (ML) SUBCUTANEOUS
Refills: 0 | Status: DISCONTINUED | OUTPATIENT
Start: 2021-01-12 | End: 2021-01-12

## 2021-01-12 RX ORDER — INSULIN GLARGINE 100 [IU]/ML
25 INJECTION, SOLUTION SUBCUTANEOUS AT BEDTIME
Refills: 0 | Status: DISCONTINUED | OUTPATIENT
Start: 2021-01-12 | End: 2021-01-13

## 2021-01-12 RX ORDER — DEXTROSE 50 % IN WATER 50 %
12.5 SYRINGE (ML) INTRAVENOUS ONCE
Refills: 0 | Status: DISCONTINUED | OUTPATIENT
Start: 2021-01-12 | End: 2021-01-12

## 2021-01-12 RX ORDER — SODIUM CHLORIDE 9 MG/ML
3 INJECTION INTRAMUSCULAR; INTRAVENOUS; SUBCUTANEOUS EVERY 8 HOURS
Refills: 0 | Status: DISCONTINUED | OUTPATIENT
Start: 2021-01-12 | End: 2021-01-14

## 2021-01-12 RX ORDER — DEXTROSE 50 % IN WATER 50 %
25 SYRINGE (ML) INTRAVENOUS ONCE
Refills: 0 | Status: DISCONTINUED | OUTPATIENT
Start: 2021-01-12 | End: 2021-01-14

## 2021-01-12 RX ADMIN — Medication 25 MILLIGRAM(S): at 19:14

## 2021-01-12 RX ADMIN — PANTOPRAZOLE SODIUM 40 MILLIGRAM(S): 20 TABLET, DELAYED RELEASE ORAL at 06:47

## 2021-01-12 RX ADMIN — Medication 100 MILLIGRAM(S): at 21:25

## 2021-01-12 RX ADMIN — Medication 81 MILLIGRAM(S): at 11:49

## 2021-01-12 RX ADMIN — Medication 100 MILLIGRAM(S): at 13:59

## 2021-01-12 RX ADMIN — CLOPIDOGREL BISULFATE 75 MILLIGRAM(S): 75 TABLET, FILM COATED ORAL at 11:49

## 2021-01-12 RX ADMIN — Medication 50 GRAM(S): at 13:08

## 2021-01-12 RX ADMIN — OXYCODONE AND ACETAMINOPHEN 2 TABLET(S): 5; 325 TABLET ORAL at 01:08

## 2021-01-12 RX ADMIN — Medication 4: at 11:43

## 2021-01-12 RX ADMIN — HEPARIN SODIUM 5000 UNIT(S): 5000 INJECTION INTRAVENOUS; SUBCUTANEOUS at 21:25

## 2021-01-12 RX ADMIN — Medication 25 MILLIGRAM(S): at 23:20

## 2021-01-12 RX ADMIN — Medication 200 GRAM(S): at 12:23

## 2021-01-12 RX ADMIN — Medication 6: at 23:14

## 2021-01-12 RX ADMIN — Medication 12.5 MILLIGRAM(S): at 10:07

## 2021-01-12 RX ADMIN — SODIUM CHLORIDE 3 MILLILITER(S): 9 INJECTION INTRAMUSCULAR; INTRAVENOUS; SUBCUTANEOUS at 14:40

## 2021-01-12 RX ADMIN — CHLORHEXIDINE GLUCONATE 1 APPLICATION(S): 213 SOLUTION TOPICAL at 06:48

## 2021-01-12 RX ADMIN — Medication 125 MILLILITER(S): at 02:36

## 2021-01-12 RX ADMIN — HEPARIN SODIUM 5000 UNIT(S): 5000 INJECTION INTRAVENOUS; SUBCUTANEOUS at 13:59

## 2021-01-12 RX ADMIN — OXYCODONE AND ACETAMINOPHEN 1 TABLET(S): 5; 325 TABLET ORAL at 06:23

## 2021-01-12 RX ADMIN — POLYETHYLENE GLYCOL 3350 17 GRAM(S): 17 POWDER, FOR SOLUTION ORAL at 11:49

## 2021-01-12 RX ADMIN — OXYCODONE AND ACETAMINOPHEN 2 TABLET(S): 5; 325 TABLET ORAL at 10:16

## 2021-01-12 RX ADMIN — Medication 100 MILLIGRAM(S): at 06:47

## 2021-01-12 RX ADMIN — HEPARIN SODIUM 5000 UNIT(S): 5000 INJECTION INTRAVENOUS; SUBCUTANEOUS at 06:47

## 2021-01-12 RX ADMIN — Medication 2: at 17:37

## 2021-01-12 RX ADMIN — OXYCODONE AND ACETAMINOPHEN 2 TABLET(S): 5; 325 TABLET ORAL at 17:14

## 2021-01-12 RX ADMIN — SODIUM CHLORIDE 3 MILLILITER(S): 9 INJECTION INTRAMUSCULAR; INTRAVENOUS; SUBCUTANEOUS at 21:08

## 2021-01-12 RX ADMIN — Medication 100 MILLIGRAM(S): at 11:49

## 2021-01-12 RX ADMIN — SENNA PLUS 2 TABLET(S): 8.6 TABLET ORAL at 21:25

## 2021-01-12 RX ADMIN — OXYCODONE AND ACETAMINOPHEN 2 TABLET(S): 5; 325 TABLET ORAL at 23:15

## 2021-01-12 RX ADMIN — INSULIN GLARGINE 25 UNIT(S): 100 INJECTION, SOLUTION SUBCUTANEOUS at 23:17

## 2021-01-12 RX ADMIN — ATORVASTATIN CALCIUM 40 MILLIGRAM(S): 80 TABLET, FILM COATED ORAL at 21:25

## 2021-01-12 NOTE — PHYSICAL THERAPY INITIAL EVALUATION ADULT - DIAGNOSIS, PT EVAL
5A: Primary Prevention/Risk Reduction for Loss of Balance and Falling , 5D: Impaired Motor Function and Sensory Integrity Associated with Nonprogressive Disorders of the Central Nervous System—Acquired in Adolescence or Adulthood

## 2021-01-12 NOTE — PROGRESS NOTE ADULT - SUBJECTIVE AND OBJECTIVE BOX
CTICU  CRITICAL  CARE  attending     Hand off received 					   Pertinent clinical, laboratory, radiographic, hemodynamic, echocardiographic, respiratory data, microbiologic data and chart were reviewed and analyzed frequently throughout the course of the day and night  Patient seen and examined with CTS/ SH attending at bedside  Pt is a 70y , Male, HEALTH ISSUES - PROBLEM Dx:      , FAMILY HISTORY:  No pertinent family history in first degree relatives    PAST MEDICAL & SURGICAL HISTORY:  Coronary artery disease    Diabetes    Hyperlipidemia    Hypertension    Gout    Previous back surgery    H/O knee surgery    Cataract  L cataract surgery 6/09      Patient is a 70y old  Male who presents with a chief complaint of CAD (12 Jan 2021 17:01)      14 system review was unremarkable    Vital signs, hemodynamic and respiratory parameters were reviewed from the bedside nursing flowsheet.  ICU Vital Signs Last 24 Hrs  T(C): 37.1 (12 Jan 2021 17:39), Max: 37.7 (12 Jan 2021 14:05)  T(F): 98.8 (12 Jan 2021 17:39), Max: 99.9 (12 Jan 2021 14:05)  HR: 104 (12 Jan 2021 16:48) (81 - 104)  BP: 156/91 (12 Jan 2021 16:48) (104/71 - 156/91)  BP(mean): 116 (12 Jan 2021 16:48) (83 - 116)  ABP: 112/63 (12 Jan 2021 12:00) (98/56 - 161/88)  ABP(mean): 81 (12 Jan 2021 12:00) (67 - 117)  RR: 18 (12 Jan 2021 16:48) (12 - 19)  SpO2: 96% (12 Jan 2021 16:48) (91% - 100%)    Adult Advanced Hemodynamics Last 24 Hrs  CVP(mm Hg): 229 (12 Jan 2021 13:00) (4 - 278)  CVP(cm H2O): --  CO: --  CI: --  PA: --  PA(mean): --  PCWP: --  SVR: --  SVRI: --  PVR: --  PVRI: --, ABG - ( 12 Jan 2021 11:12 )  pH, Arterial: 7.40  pH, Blood: x     /  pCO2: 36    /  pO2: 70    / HCO3: 22    / Base Excess: -2.6  /  SaO2: 93                Mode: CPAP with PS  FiO2: 40  PEEP: 5  PS: 10  MAP: 9  PIP: 15    Intake and output was reviewed and the fluid balance was calculated  Daily     Daily   I&O's Summary    11 Jan 2021 07:01  -  12 Jan 2021 07:00  --------------------------------------------------------  IN: 1187 mL / OUT: 1585 mL / NET: -398 mL    12 Jan 2021 07:01  -  12 Jan 2021 17:57  --------------------------------------------------------  IN: 620 mL / OUT: 505 mL / NET: 115 mL        All lines and drain sites were assessed  Glycemic trend was reviewedCAPILLARY BLOOD GLUCOSE      POCT Blood Glucose.: 168 mg/dL (12 Jan 2021 17:27)    No acute change in mental status  Auscultation of the chest reveals equal bs  Abdomen is soft  Extremities are warm and well perfused  Wounds appear clean and unremarkable  Antibiotics are periop    labs  CBC Full  -  ( 12 Jan 2021 11:13 )  WBC Count : 15.03 K/uL  RBC Count : 4.65 M/uL  Hemoglobin : 12.0 g/dL  Hematocrit : 37.0 %  Platelet Count - Automated : 274 K/uL  Mean Cell Volume : 79.6 fl  Mean Cell Hemoglobin : 25.8 pg  Mean Cell Hemoglobin Concentration : 32.4 gm/dL  Auto Neutrophil # : x  Auto Lymphocyte # : x  Auto Monocyte # : x  Auto Eosinophil # : x  Auto Basophil # : x  Auto Neutrophil % : x  Auto Lymphocyte % : x  Auto Monocyte % : x  Auto Eosinophil % : x  Auto Basophil % : x    01-12    136  |  102  |  9   ----------------------------<  228<H>  4.4   |  22  |  0.88    Ca    8.9      12 Jan 2021 11:13  Phos  3.6     01-12  Mg     1.9     01-12    TPro  6.8  /  Alb  3.6  /  TBili  1.4<H>  /  DBili  x   /  AST  21  /  ALT  14  /  AlkPhos  86  01-12    PT/INR - ( 12 Jan 2021 11:12 )   PT: 13.3 sec;   INR: 1.11          PTT - ( 12 Jan 2021 11:12 )  PTT:28.6 sec  The current medications were reviewed   MEDICATIONS  (STANDING):  allopurinol 100 milliGRAM(s) Oral daily  aspirin enteric coated 81 milliGRAM(s) Oral daily  atorvastatin 40 milliGRAM(s) Oral at bedtime  ceFAZolin   IVPB 2000 milliGRAM(s) IV Intermittent every 8 hours  chlorhexidine 2% Cloths 1 Application(s) Topical <User Schedule>  clopidogrel Tablet 75 milliGRAM(s) Oral daily  dextrose 50% Injectable 25 Gram(s) IV Push once  dextrose 50% Injectable 12.5 Gram(s) IV Push once  heparin   Injectable 5000 Unit(s) SubCutaneous every 8 hours  insulin glargine Injectable (LANTUS) 25 Unit(s) SubCutaneous at bedtime  insulin lispro (ADMELOG) corrective regimen sliding scale   SubCutaneous Before meals and at bedtime  insulin lispro Injectable (ADMELOG) 4 Unit(s) SubCutaneous three times a day before meals  metoprolol tartrate 25 milliGRAM(s) Oral every 6 hours  pantoprazole    Tablet 40 milliGRAM(s) Oral before breakfast  polyethylene glycol 3350 17 Gram(s) Oral daily  senna 2 Tablet(s) Oral at bedtime  sodium chloride 0.9% lock flush 3 milliLiter(s) IV Push every 8 hours    MEDICATIONS  (PRN):  acetaminophen   Tablet .. 650 milliGRAM(s) Oral every 6 hours PRN Mild Pain (1 - 3)  oxycodone    5 mG/acetaminophen 325 mG 1 Tablet(s) Oral every 4 hours PRN Moderate Pain (4 - 6)  oxycodone    5 mG/acetaminophen 325 mG 2 Tablet(s) Oral every 6 hours PRN Severe Pain (7 - 10)       PROBLEM LIST/ ASSESSMENT:  HEALTH ISSUES - PROBLEM Dx:      ,   Patient is a 70y old  Male who presents with a chief complaint of CAD (12 Jan 2021 17:01)     s/p cardiac surgery                My plan includes :  close hemodynamic, ventilatory and drain monitoring and management per post op routine    Monitor for arrhythmias and monitor parameters for organ perfusion  beta blockade not administered due to hemodynamic instability and bradycardia  monitor neurologic status  Head of the bed should remain elevated to 45 deg .   chest PT and IS will be encouraged  monitor adequacy of oxygenation and ventilation and attempt to wean oxygen  antibiotic regimen will be tailored to the clinical, laboratory and microbiologic data  Nutritional goals will be met using po eventually , ensure adequate caloric intake and montior the same  Stress ulcer and VTE prophylaxis will be achieved    Glycemic control is satisfactory  Electrolytes have been repleted as necessary and wound care has been carried out. Pain control has been achieved.   agressive physical therapy and early mobility and ambulation goals will be met   The family was updated about the course and plan  CRITICAL CARE TIME personally provided by me  in evaluation and management, reassessments, review and interpretation of labs and x-rays, ventilator and hemodynamic management, formulating a plan and coordinating care: ___90____ MIN.  Time does not include procedural time. Time spent was non routine post-operarive caRE and included multiple and repeated evaluations at the bedside  CTICU ATTENDING     					    Aric Mendoza MD

## 2021-01-12 NOTE — PHYSICAL THERAPY INITIAL EVALUATION ADULT - IMPAIRMENTS CONTRIBUTING TO GAIT DEVIATIONS, PT EVAL
desat to 70s on RA, resolved to 95% ~50ft with standing rest break/impaired balance/pain/impaired postural control/decreased strength

## 2021-01-12 NOTE — PROGRESS NOTE ADULT - SUBJECTIVE AND OBJECTIVE BOX
Patient discussed on morning rounds with Dr. Acuna    Operation / Date: : 1/11/2020 MIDCAB, EF 55%    SUBJECTIVE ASSESSMENT:  70y Male seen and examined this afternoon. Patient ambulated from [facility] to [facility] without pain or issue. Patient states pain is well controlled with percocet, eating and drinking normally. Patient states using IS multiple times a day. Patient denies dizziness, vision changes, chest pain, palpitations, shortness of breath, cough, n/v/d, extremity swelling, calf tenderness.         Vital Signs Last 24 Hrs  T(C): 37.7 (12 Jan 2021 14:05), Max: 37.7 (12 Jan 2021 14:05)  T(F): 99.9 (12 Jan 2021 14:05), Max: 99.9 (12 Jan 2021 14:05)  HR: 99 (12 Jan 2021 16:00) (80 - 99)  BP: 127/68 (12 Jan 2021 16:00) (104/71 - 144/79)  BP(mean): 92 (12 Jan 2021 16:00) (83 - 106)  RR: 19 (12 Jan 2021 16:00) (12 - 19)  SpO2: 94% (12 Jan 2021 16:00) (91% - 100%)  I&O's Detail    11 Jan 2021 07:01  -  12 Jan 2021 07:00  --------------------------------------------------------  IN:    Albumin 5%  - 250 mL: 500 mL    Insulin: 32 mL    IV PiggyBack: 400 mL    NiCARdipine: 25 mL    Oral Fluid: 80 mL    sodium chloride 0.9%: 150 mL  Total IN: 1187 mL    OUT:    Chest Tube (mL): 150 mL    Drain (mL): 145 mL    Indwelling Catheter - Urethral (mL): 1290 mL  Total OUT: 1585 mL    Total NET: -398 mL      12 Jan 2021 07:01  -  12 Jan 2021 17:01  --------------------------------------------------------  IN:    IV PiggyBack: 150 mL    Oral Fluid: 410 mL    sodium chloride 0.9%: 60 mL  Total IN: 620 mL    OUT:    Chest Tube (mL): 30 mL    Drain (mL): 30 mL    Indwelling Catheter - Urethral (mL): 445 mL  Total OUT: 505 mL    Total NET: 115 mL      CHEST TUBE:  No.  MEAGAN DRAIN:  No.  EPICARDIAL WIRES: No.  TIE DOWNS: Yes  ARAUJO: No.    PHYSICAL EXAM:  Neuro: A+O x 3, non-focal, speech clear and intact  HEENT: PERRL, EOMI, oral mucosa pink and moist  Neck: supple, no JVD  CV: regular rate, regular rhythm, +S1S2, no murmurs or rub  Pulm/chest: lung sounds CTA and equal bilaterally, no accessory muscle use noted. Small hematoma vs subq tissue noted on anterior chest superior to midcab incision.   Abd: soft, NT, ND, +BS  Ext: BOLIVAR x 4, no C/C/E SCD in place  Skin: warm, well perfused, no rashes     LABS:                        12.0   15.03 )-----------( 274      ( 12 Jan 2021 11:13 )             37.0     PT/INR - ( 12 Jan 2021 11:12 )   PT: 13.3 sec;   INR: 1.11          PTT - ( 12 Jan 2021 11:12 )  PTT:28.6 sec    01-12    136  |  102  |  9   ----------------------------<  228<H>  4.4   |  22  |  0.88    Ca    8.9      12 Jan 2021 11:13  Phos  3.6     01-12  Mg     1.9     01-12    TPro  6.8  /  Alb  3.6  /  TBili  1.4<H>  /  DBili  x   /  AST  21  /  ALT  14  /  AlkPhos  86  01-12          MEDICATIONS  (STANDING):  allopurinol 100 milliGRAM(s) Oral daily  aspirin enteric coated 81 milliGRAM(s) Oral daily  atorvastatin 40 milliGRAM(s) Oral at bedtime  ceFAZolin   IVPB 2000 milliGRAM(s) IV Intermittent every 8 hours  chlorhexidine 2% Cloths 1 Application(s) Topical <User Schedule>  clopidogrel Tablet 75 milliGRAM(s) Oral daily  dextrose 40% Gel 15 Gram(s) Oral once  dextrose 5%. 1000 milliLiter(s) (50 mL/Hr) IV Continuous <Continuous>  dextrose 5%. 1000 milliLiter(s) (100 mL/Hr) IV Continuous <Continuous>  dextrose 50% Injectable 25 Gram(s) IV Push once  dextrose 50% Injectable 12.5 Gram(s) IV Push once  dextrose 50% Injectable 25 Gram(s) IV Push once  glucagon  Injectable 1 milliGRAM(s) IntraMuscular once  heparin   Injectable 5000 Unit(s) SubCutaneous every 8 hours  insulin lispro (ADMELOG) corrective regimen sliding scale   SubCutaneous Before meals and at bedtime  metoprolol tartrate 25 milliGRAM(s) Oral every 6 hours  pantoprazole    Tablet 40 milliGRAM(s) Oral before breakfast  polyethylene glycol 3350 17 Gram(s) Oral daily  senna 2 Tablet(s) Oral at bedtime  sodium chloride 0.9% lock flush 3 milliLiter(s) IV Push every 8 hours    MEDICATIONS  (PRN):  acetaminophen   Tablet .. 650 milliGRAM(s) Oral every 6 hours PRN Mild Pain (1 - 3)  oxycodone    5 mG/acetaminophen 325 mG 1 Tablet(s) Oral every 4 hours PRN Moderate Pain (4 - 6)  oxycodone    5 mG/acetaminophen 325 mG 2 Tablet(s) Oral every 6 hours PRN Severe Pain (7 - 10)        RADIOLOGY & ADDITIONAL TESTS:     Patient discussed on morning rounds with Dr. Acuna    Operation / Date: : 1/11/2020 MIDCAB, EF 55%    SUBJECTIVE ASSESSMENT:  70y Male seen and examined this afternoon. Patient ambulated from [facility] to [facility] without pain or dizziness or shortness of breath. Patient states pain is well controlled with percocet, eating and drinking normally. Patient states using IS multiple times a day. Patient denies dizziness, vision changes, chest pain, palpitations, shortness of breath, cough, n/v/d, extremity swelling, calf tenderness.         Vital Signs Last 24 Hrs  T(C): 37.7 (12 Jan 2021 14:05), Max: 37.7 (12 Jan 2021 14:05)  T(F): 99.9 (12 Jan 2021 14:05), Max: 99.9 (12 Jan 2021 14:05)  HR: 99 (12 Jan 2021 16:00) (80 - 99)  BP: 127/68 (12 Jan 2021 16:00) (104/71 - 144/79)  BP(mean): 92 (12 Jan 2021 16:00) (83 - 106)  RR: 19 (12 Jan 2021 16:00) (12 - 19)  SpO2: 94% (12 Jan 2021 16:00) (91% - 100%)  I&O's Detail    11 Jan 2021 07:01  -  12 Jan 2021 07:00  --------------------------------------------------------  IN:    Albumin 5%  - 250 mL: 500 mL    Insulin: 32 mL    IV PiggyBack: 400 mL    NiCARdipine: 25 mL    Oral Fluid: 80 mL    sodium chloride 0.9%: 150 mL  Total IN: 1187 mL    OUT:    Chest Tube (mL): 150 mL    Drain (mL): 145 mL    Indwelling Catheter - Urethral (mL): 1290 mL  Total OUT: 1585 mL    Total NET: -398 mL      12 Jan 2021 07:01  -  12 Jan 2021 17:01  --------------------------------------------------------  IN:    IV PiggyBack: 150 mL    Oral Fluid: 410 mL    sodium chloride 0.9%: 60 mL  Total IN: 620 mL    OUT:    Chest Tube (mL): 30 mL    Drain (mL): 30 mL    Indwelling Catheter - Urethral (mL): 445 mL  Total OUT: 505 mL    Total NET: 115 mL      CHEST TUBE:  No.  MEAGAN DRAIN:  No.  EPICARDIAL WIRES: No.  TIE DOWNS: Yes  ARAUJO: No.    PHYSICAL EXAM:  Neuro: A+O x 3, non-focal, speech clear and intact  HEENT: PERRL, EOMI, oral mucosa pink and moist  Neck: supple, no JVD, TLC in right IJ  CV: regular rate, regular rhythm, +S1S2, no murmurs or rub  Pulm/chest: lung sounds CTA and equal bilaterally, no accessory muscle use noted. Small hematoma vs subq tissue noted on anterior chest superior to midcab incision.   Abd: soft, NT, ND, +BS  Ext: BOLIVAR x 4, no C/C/E SCD in place  Skin: warm, well perfused, no rashes     LABS:                        12.0   15.03 )-----------( 274      ( 12 Jan 2021 11:13 )             37.0     PT/INR - ( 12 Jan 2021 11:12 )   PT: 13.3 sec;   INR: 1.11          PTT - ( 12 Jan 2021 11:12 )  PTT:28.6 sec    01-12    136  |  102  |  9   ----------------------------<  228<H>  4.4   |  22  |  0.88    Ca    8.9      12 Jan 2021 11:13  Phos  3.6     01-12  Mg     1.9     01-12    TPro  6.8  /  Alb  3.6  /  TBili  1.4<H>  /  DBili  x   /  AST  21  /  ALT  14  /  AlkPhos  86  01-12          MEDICATIONS  (STANDING):  allopurinol 100 milliGRAM(s) Oral daily  aspirin enteric coated 81 milliGRAM(s) Oral daily  atorvastatin 40 milliGRAM(s) Oral at bedtime  ceFAZolin   IVPB 2000 milliGRAM(s) IV Intermittent every 8 hours  chlorhexidine 2% Cloths 1 Application(s) Topical <User Schedule>  clopidogrel Tablet 75 milliGRAM(s) Oral daily  dextrose 40% Gel 15 Gram(s) Oral once  dextrose 5%. 1000 milliLiter(s) (50 mL/Hr) IV Continuous <Continuous>  dextrose 5%. 1000 milliLiter(s) (100 mL/Hr) IV Continuous <Continuous>  dextrose 50% Injectable 25 Gram(s) IV Push once  dextrose 50% Injectable 12.5 Gram(s) IV Push once  dextrose 50% Injectable 25 Gram(s) IV Push once  glucagon  Injectable 1 milliGRAM(s) IntraMuscular once  heparin   Injectable 5000 Unit(s) SubCutaneous every 8 hours  insulin lispro (ADMELOG) corrective regimen sliding scale   SubCutaneous Before meals and at bedtime  metoprolol tartrate 25 milliGRAM(s) Oral every 6 hours  pantoprazole    Tablet 40 milliGRAM(s) Oral before breakfast  polyethylene glycol 3350 17 Gram(s) Oral daily  senna 2 Tablet(s) Oral at bedtime  sodium chloride 0.9% lock flush 3 milliLiter(s) IV Push every 8 hours    MEDICATIONS  (PRN):  acetaminophen   Tablet .. 650 milliGRAM(s) Oral every 6 hours PRN Mild Pain (1 - 3)  oxycodone    5 mG/acetaminophen 325 mG 1 Tablet(s) Oral every 4 hours PRN Moderate Pain (4 - 6)  oxycodone    5 mG/acetaminophen 325 mG 2 Tablet(s) Oral every 6 hours PRN Severe Pain (7 - 10)        RADIOLOGY & ADDITIONAL TESTS:

## 2021-01-12 NOTE — PHYSICAL THERAPY INITIAL EVALUATION ADULT - ACTIVE RANGE OF MOTION EXAMINATION, REHAB EVAL
except B knee extn -15 deg/bilateral upper extremity Active ROM was WFL (within functional limits)/bilateral  lower extremity Active ROM was WFL (within functional limits)

## 2021-01-12 NOTE — PHYSICAL THERAPY INITIAL EVALUATION ADULT - GENERAL OBSERVATIONS, REHAB EVAL
FIM gait=4. Spoke to RN Cyndie, pt cleared for PT, pre medicated. Pt rcvd OOB to chair, +tele, +peres, +L chest tubes to wall suction, +moise to wall suction, +central line, +Gena, thoracotomy site dressing CDI. Pt agreeable to PT, A&Ox4, reports 8/10 pain. David session fairly well

## 2021-01-12 NOTE — PHYSICAL THERAPY INITIAL EVALUATION ADULT - ADDITIONAL COMMENTS
[place] with [address] with wife. At baseline indpt without device. Amb tolerance ~1/2 block. H/o R knee surgeries and PT. Denies falls, device use. R handed.

## 2021-01-12 NOTE — CHART NOTE - NSCHARTNOTEFT_GEN_A_CORE
CT Removal:    Pt seen and examined at bedside.  Case discussed with Dr. Acuna.  Minimal output from CT.  No air leak appreciated.  CT removed without incident per Dr. Acuna request.  Occlusive DSD placed.  CXR no obvious PTX noted.  Pt tolerated procedure well.

## 2021-01-12 NOTE — PROGRESS NOTE ADULT - ASSESSMENT
71 yo male with PMH of HTN, HLD, DM, CAD s/p PCIs and class III angina s/p cardiac cath that revealed severe 3 vessel CAD. Dr. Acuna reviewed the cardiac cath imaging and reports with the patient and discussed the case with Dr. Sheth.  Dr. Acuna discussed the risks, benefits and alternatives to surgery. Risks include but not limited to death, heart attack, bleeding, stroke, kidney problems and infection. He quoted a 1% operative mortality and complication risk.  He also discussed the various approaches, minimally invasive versus sternotomy. Dr. Acuna feels the patient will benefit and is a candidate for robotic assisted MIDCAB (LIMA->LAD) as part of hybrid procedure. All questions were addressed and patient agrees to proceed with surgery. POD1 Kwabena removed, Patient downgraded and transferred to [facility].    Neurovascular:   No delirium. Pain well controlled with current regimen.  -Continue percocet PRN     Cardiovascular:   Hemodynamically stable. HR controlled    CAD  -continue aspirin Plavix for acute graft patency,  -continue statin for long term graft patency  BP  -continue Lopressor, for BP control       Respiratory:   02 Sat = 96% on RA.  -Wean to RA from for O2 Sat > 93%.  -Encourage Cough, deep breathing and Use of IS 10x / hr while awake.  -Chest PT 4xdaily    GI:   Stable  -Protonix for GI protection  -Miralax and Senna for bowl regimen  -PO DASH diet Consistent Carb    Renal / :   BUN/Cr Stable  -Continue to monitor I/O's.    Endocrine:    A1C 8.1 on oral medications and insulin at home   -Currently on insulin sliding scale, will adjust as necessary    Hematologic:  H/H stable  -DVT prophylaxis with Heparin sq and SCD    ID:  -Afebrile  -Continue to observe for SIRS/Sepsis Syndrome.    Disposition:  Possible home

## 2021-01-13 DIAGNOSIS — Z79.84 LONG TERM (CURRENT) USE OF ORAL HYPOGLYCEMIC DRUGS: ICD-10-CM

## 2021-01-13 DIAGNOSIS — I10 ESSENTIAL (PRIMARY) HYPERTENSION: ICD-10-CM

## 2021-01-13 DIAGNOSIS — E11.9 TYPE 2 DIABETES MELLITUS WITHOUT COMPLICATIONS: ICD-10-CM

## 2021-01-13 DIAGNOSIS — I25.110 ATHEROSCLEROTIC HEART DISEASE OF NATIVE CORONARY ARTERY WITH UNSTABLE ANGINA PECTORIS: ICD-10-CM

## 2021-01-13 DIAGNOSIS — E78.5 HYPERLIPIDEMIA, UNSPECIFIED: ICD-10-CM

## 2021-01-13 DIAGNOSIS — R94.39 ABNORMAL RESULT OF OTHER CARDIOVASCULAR FUNCTION STUDY: ICD-10-CM

## 2021-01-13 LAB
ANION GAP SERPL CALC-SCNC: 11 MMOL/L — SIGNIFICANT CHANGE UP (ref 5–17)
BUN SERPL-MCNC: 14 MG/DL — SIGNIFICANT CHANGE UP (ref 7–23)
CALCIUM SERPL-MCNC: 8.3 MG/DL — LOW (ref 8.4–10.5)
CHLORIDE SERPL-SCNC: 102 MMOL/L — SIGNIFICANT CHANGE UP (ref 96–108)
CO2 SERPL-SCNC: 22 MMOL/L — SIGNIFICANT CHANGE UP (ref 22–31)
CREAT SERPL-MCNC: 1.11 MG/DL — SIGNIFICANT CHANGE UP (ref 0.5–1.3)
GLUCOSE BLDC GLUCOMTR-MCNC: 131 MG/DL — HIGH (ref 70–99)
GLUCOSE BLDC GLUCOMTR-MCNC: 144 MG/DL — HIGH (ref 70–99)
GLUCOSE BLDC GLUCOMTR-MCNC: 190 MG/DL — HIGH (ref 70–99)
GLUCOSE BLDC GLUCOMTR-MCNC: 250 MG/DL — HIGH (ref 70–99)
GLUCOSE SERPL-MCNC: 136 MG/DL — HIGH (ref 70–99)
HCT VFR BLD CALC: 36.4 % — LOW (ref 39–50)
HGB BLD-MCNC: 11.8 G/DL — LOW (ref 13–17)
MAGNESIUM SERPL-MCNC: 2 MG/DL — SIGNIFICANT CHANGE UP (ref 1.6–2.6)
MCHC RBC-ENTMCNC: 26.4 PG — LOW (ref 27–34)
MCHC RBC-ENTMCNC: 32.4 GM/DL — SIGNIFICANT CHANGE UP (ref 32–36)
MCV RBC AUTO: 81.4 FL — SIGNIFICANT CHANGE UP (ref 80–100)
NRBC # BLD: 0 /100 WBCS — SIGNIFICANT CHANGE UP (ref 0–0)
PLATELET # BLD AUTO: 249 K/UL — SIGNIFICANT CHANGE UP (ref 150–400)
POTASSIUM SERPL-MCNC: 4.4 MMOL/L — SIGNIFICANT CHANGE UP (ref 3.5–5.3)
POTASSIUM SERPL-SCNC: 4.4 MMOL/L — SIGNIFICANT CHANGE UP (ref 3.5–5.3)
RBC # BLD: 4.47 M/UL — SIGNIFICANT CHANGE UP (ref 4.2–5.8)
RBC # FLD: 15.8 % — HIGH (ref 10.3–14.5)
SODIUM SERPL-SCNC: 135 MMOL/L — SIGNIFICANT CHANGE UP (ref 135–145)
WBC # BLD: 15.34 K/UL — HIGH (ref 3.8–10.5)
WBC # FLD AUTO: 15.34 K/UL — HIGH (ref 3.8–10.5)

## 2021-01-13 PROCEDURE — 71046 X-RAY EXAM CHEST 2 VIEWS: CPT | Mod: 26

## 2021-01-13 PROCEDURE — 99222 1ST HOSP IP/OBS MODERATE 55: CPT | Mod: GC

## 2021-01-13 RX ORDER — DEXTROSE 50 % IN WATER 50 %
15 SYRINGE (ML) INTRAVENOUS ONCE
Refills: 0 | Status: DISCONTINUED | OUTPATIENT
Start: 2021-01-13 | End: 2021-01-14

## 2021-01-13 RX ORDER — INSULIN GLARGINE 100 [IU]/ML
22 INJECTION, SOLUTION SUBCUTANEOUS AT BEDTIME
Refills: 0 | Status: DISCONTINUED | OUTPATIENT
Start: 2021-01-13 | End: 2021-01-14

## 2021-01-13 RX ORDER — GLUCAGON INJECTION, SOLUTION 0.5 MG/.1ML
1 INJECTION, SOLUTION SUBCUTANEOUS ONCE
Refills: 0 | Status: DISCONTINUED | OUTPATIENT
Start: 2021-01-13 | End: 2021-01-14

## 2021-01-13 RX ORDER — INSULIN LISPRO 100/ML
8 VIAL (ML) SUBCUTANEOUS
Refills: 0 | Status: DISCONTINUED | OUTPATIENT
Start: 2021-01-13 | End: 2021-01-14

## 2021-01-13 RX ORDER — SODIUM CHLORIDE 9 MG/ML
1000 INJECTION, SOLUTION INTRAVENOUS
Refills: 0 | Status: DISCONTINUED | OUTPATIENT
Start: 2021-01-13 | End: 2021-01-14

## 2021-01-13 RX ADMIN — SODIUM CHLORIDE 3 MILLILITER(S): 9 INJECTION INTRAMUSCULAR; INTRAVENOUS; SUBCUTANEOUS at 13:05

## 2021-01-13 RX ADMIN — CLOPIDOGREL BISULFATE 75 MILLIGRAM(S): 75 TABLET, FILM COATED ORAL at 11:02

## 2021-01-13 RX ADMIN — Medication 100 MILLIGRAM(S): at 11:01

## 2021-01-13 RX ADMIN — HEPARIN SODIUM 5000 UNIT(S): 5000 INJECTION INTRAVENOUS; SUBCUTANEOUS at 15:17

## 2021-01-13 RX ADMIN — SODIUM CHLORIDE 3 MILLILITER(S): 9 INJECTION INTRAMUSCULAR; INTRAVENOUS; SUBCUTANEOUS at 05:03

## 2021-01-13 RX ADMIN — HEPARIN SODIUM 5000 UNIT(S): 5000 INJECTION INTRAVENOUS; SUBCUTANEOUS at 05:02

## 2021-01-13 RX ADMIN — HEPARIN SODIUM 5000 UNIT(S): 5000 INJECTION INTRAVENOUS; SUBCUTANEOUS at 21:23

## 2021-01-13 RX ADMIN — Medication 4 UNIT(S): at 17:19

## 2021-01-13 RX ADMIN — Medication 25 MILLIGRAM(S): at 11:02

## 2021-01-13 RX ADMIN — Medication 100 MILLIGRAM(S): at 05:02

## 2021-01-13 RX ADMIN — INSULIN GLARGINE 22 UNIT(S): 100 INJECTION, SOLUTION SUBCUTANEOUS at 21:24

## 2021-01-13 RX ADMIN — Medication 650 MILLIGRAM(S): at 16:30

## 2021-01-13 RX ADMIN — OXYCODONE AND ACETAMINOPHEN 2 TABLET(S): 5; 325 TABLET ORAL at 05:15

## 2021-01-13 RX ADMIN — Medication 2: at 17:19

## 2021-01-13 RX ADMIN — Medication 81 MILLIGRAM(S): at 11:01

## 2021-01-13 RX ADMIN — ATORVASTATIN CALCIUM 40 MILLIGRAM(S): 80 TABLET, FILM COATED ORAL at 21:24

## 2021-01-13 RX ADMIN — SODIUM CHLORIDE 3 MILLILITER(S): 9 INJECTION INTRAMUSCULAR; INTRAVENOUS; SUBCUTANEOUS at 21:20

## 2021-01-13 RX ADMIN — Medication 25 MILLIGRAM(S): at 17:01

## 2021-01-13 RX ADMIN — Medication 25 MILLIGRAM(S): at 05:02

## 2021-01-13 RX ADMIN — Medication 4 UNIT(S): at 12:05

## 2021-01-13 RX ADMIN — CHLORHEXIDINE GLUCONATE 1 APPLICATION(S): 213 SOLUTION TOPICAL at 05:03

## 2021-01-13 RX ADMIN — PANTOPRAZOLE SODIUM 40 MILLIGRAM(S): 20 TABLET, DELAYED RELEASE ORAL at 07:14

## 2021-01-13 RX ADMIN — Medication 4: at 12:05

## 2021-01-13 RX ADMIN — Medication 4 UNIT(S): at 07:45

## 2021-01-13 RX ADMIN — POLYETHYLENE GLYCOL 3350 17 GRAM(S): 17 POWDER, FOR SOLUTION ORAL at 11:02

## 2021-01-13 RX ADMIN — SENNA PLUS 2 TABLET(S): 8.6 TABLET ORAL at 21:23

## 2021-01-13 RX ADMIN — OXYCODONE AND ACETAMINOPHEN 2 TABLET(S): 5; 325 TABLET ORAL at 21:23

## 2021-01-13 NOTE — PROGRESS NOTE ADULT - SUBJECTIVE AND OBJECTIVE BOX
Patient discussed on morning rounds with Dr. Acuna    Operation / Date: 1/11/20: MIDCAB EF 55%    SUBJECTIVE ASSESSMENT:  Patient is feeling well today.  He is lurdes 1000 cc on his IS. He has not had a BM but is passing gas.  Denies HA, dizziness, CP, SOB, palpitations, LE edema or calf pain.     Vital Signs Last 24 Hrs  T(C): 37.1 (13 Jan 2021 09:10), Max: 37.8 (12 Jan 2021 21:06)  T(F): 98.8 (13 Jan 2021 09:10), Max: 100 (12 Jan 2021 21:06)  HR: 92 (13 Jan 2021 11:50) (84 - 106)  BP: 154/78 (13 Jan 2021 11:50) (106/66 - 156/91)  BP(mean): 96 (13 Jan 2021 11:00) (76 - 116)  RR: 16 (13 Jan 2021 11:50) (16 - 19)  SpO2: 95% (13 Jan 2021 11:50) (92% - 96%)  I&O's Detail    12 Jan 2021 07:01  -  13 Jan 2021 07:00  --------------------------------------------------------  IN:    IV PiggyBack: 250 mL    Oral Fluid: 440 mL    sodium chloride 0.9%: 60 mL  Total IN: 750 mL    OUT:    Chest Tube (mL): 30 mL    Drain (mL): 70 mL    Indwelling Catheter - Urethral (mL): 445 mL    Voided (mL): 500 mL  Total OUT: 1045 mL    Total NET: -295 mL      13 Jan 2021 07:01  -  13 Jan 2021 12:46  --------------------------------------------------------  IN:  Total IN: 0 mL    OUT:    Drain (mL): 60 mL  Total OUT: 60 mL    Total NET: -60 mL          CHEST TUBE:  No.   MEAGAN DRAIN:  Yes.  EPICARDIAL WIRES: No.  TIE DOWNS: Yes.  ARAUJO: No.    PHYSICAL EXAM:    General: Lying in bed, comfortable, NAD    Neurological: A&O x 3 BOLIVAR, non focal     Cardiovascular: S1S2 RRR No M/G/R    Respiratory: CTA b/l no w/r/r    Gastrointestinal: bs present, soft, NT/ND    Extremities: No edema and no calf tenderness    Vascular: radial and DP pulses 1 + b/l    Incision Sites: left mini thoracotomy incision healing well, dermabond intact, no dehiscence.     LABS:                        11.8   15.34 )-----------( 249      ( 13 Jan 2021 06:06 )             36.4       PT/INR - ( 12 Jan 2021 11:12 )   PT: 13.3 sec;   INR: 1.11          PTT - ( 12 Jan 2021 11:12 )  PTT:28.6 sec    01-13    135  |  102  |  14  ----------------------------<  136<H>  4.4   |  22  |  1.11    Ca    8.3<L>      13 Jan 2021 06:06  Phos  3.6     01-12  Mg     2.0     01-13    TPro  6.8  /  Alb  3.6  /  TBili  1.4<H>  /  DBili  x   /  AST  21  /  ALT  14  /  AlkPhos  86  01-12      MEDICATIONS  (STANDING):  allopurinol 100 milliGRAM(s) Oral daily  aspirin enteric coated 81 milliGRAM(s) Oral daily  atorvastatin 40 milliGRAM(s) Oral at bedtime  chlorhexidine 2% Cloths 1 Application(s) Topical <User Schedule>  clopidogrel Tablet 75 milliGRAM(s) Oral daily  dextrose 50% Injectable 25 Gram(s) IV Push once  dextrose 50% Injectable 12.5 Gram(s) IV Push once  fentaNYL    Injectable 25 MICROGram(s) IV Push once  heparin   Injectable 5000 Unit(s) SubCutaneous every 8 hours  insulin glargine Injectable (LANTUS) 25 Unit(s) SubCutaneous at bedtime  insulin lispro (ADMELOG) corrective regimen sliding scale   SubCutaneous Before meals and at bedtime  insulin lispro Injectable (ADMELOG) 4 Unit(s) SubCutaneous three times a day before meals  metoprolol tartrate 25 milliGRAM(s) Oral every 6 hours  pantoprazole    Tablet 40 milliGRAM(s) Oral before breakfast  polyethylene glycol 3350 17 Gram(s) Oral daily  senna 2 Tablet(s) Oral at bedtime  sodium chloride 0.9% lock flush 3 milliLiter(s) IV Push every 8 hours    MEDICATIONS  (PRN):  acetaminophen   Tablet .. 650 milliGRAM(s) Oral every 6 hours PRN Mild Pain (1 - 3)  oxycodone    5 mG/acetaminophen 325 mG 1 Tablet(s) Oral every 4 hours PRN Moderate Pain (4 - 6)  oxycodone    5 mG/acetaminophen 325 mG 2 Tablet(s) Oral every 6 hours PRN Severe Pain (7 - 10)        RADIOLOGY & ADDITIONAL TESTS:

## 2021-01-13 NOTE — CONSULT NOTE ADULT - ATTENDING COMMENTS
Pt seen on rounds this afternoon.  Was on metformin plus Lantus as outpatient, A1c level only moderately elevated to 8.1% on admission.  Based on his fingerstick data at home, he almost certainly needed either an oral secretagogue or pre-meal insulin added to his regimen.  He reports having been started on glipizide, but might have had an allergic reaction to it.    He is now on 25 Lantus plus sliding scale--AM fingerstick was down to 131 this morning, but post-prandials have been high.  PO intake fairly good.  To decrease the lantus to 22 and increase the pre-meal to 8 units as noted above.

## 2021-01-13 NOTE — CONSULT NOTE ADULT - SUBJECTIVE AND OBJECTIVE BOX
HPI: 69 y/o male, former smoker, w/ PMHx  of known CAD (s/p PCI of OM2, 12 years ago), HTN, HLD, gout, and type II DM. He  presented to cardiologist, Dr. Jerson Sheth, with c/o NAVARRO w/ ambulation of one-two blocks, resolved w/ rest and associated w/ chest tightness which subsequently resolves w/ rest. Patient denied palpitations, syncope, PND/orthopnea, LE edema, fever, chills, N/V/D, URI symptoms, cough, recent illness, travel, or sick contacts. NST 12/10/2020 revealed EF 50%, apical septal wall ischemia and inferior wall infarct w/ residual ischemia identified. In light of patient's risk factors, CCS III anginal symptoms, known CAD, and abnormal NST patient is referred for cardiac catheterization w/ possible intervention if clinically indicated. 1/5/21 Cardiac cath revealed 3 vessel CAD. Dr. Acuna consulted for evaluation for midcab as part of hybrid procedure. s/p MIDCAB on 1/11/2021.     Endo consulted for DM management.           PMH & Surgical Hx:I25.10  Diabetes  Hyperlipidemia  Hypertension  Gout  CAD in native artery  Minimally invasive direct coronary artery bypass (MIDCAB) with transesophageal echocardiography (YANETH)  Previous back surgery  H/O knee surgery  Cataract          Current Meds:  acetaminophen   Tablet .. 650 milliGRAM(s) Oral every 6 hours PRN  allopurinol 100 milliGRAM(s) Oral daily  aspirin enteric coated 81 milliGRAM(s) Oral daily  atorvastatin 40 milliGRAM(s) Oral at bedtime  chlorhexidine 2% Cloths 1 Application(s) Topical <User Schedule>  clopidogrel Tablet 75 milliGRAM(s) Oral daily  dextrose 50% Injectable 25 Gram(s) IV Push once  dextrose 50% Injectable 12.5 Gram(s) IV Push once  fentaNYL    Injectable 25 MICROGram(s) IV Push once  heparin   Injectable 5000 Unit(s) SubCutaneous every 8 hours  insulin glargine Injectable (LANTUS) 25 Unit(s) SubCutaneous at bedtime  insulin lispro (ADMELOG) corrective regimen sliding scale   SubCutaneous Before meals and at bedtime  insulin lispro Injectable (ADMELOG) 4 Unit(s) SubCutaneous three times a day before meals  metoprolol tartrate 25 milliGRAM(s) Oral every 6 hours  oxycodone    5 mG/acetaminophen 325 mG 1 Tablet(s) Oral every 4 hours PRN  oxycodone    5 mG/acetaminophen 325 mG 2 Tablet(s) Oral every 6 hours PRN  pantoprazole    Tablet 40 milliGRAM(s) Oral before breakfast  polyethylene glycol 3350 17 Gram(s) Oral daily  senna 2 Tablet(s) Oral at bedtime  sodium chloride 0.9% lock flush 3 milliLiter(s) IV Push every 8 hours      Allergies:  No Known Allergies      ROS:  Denies the following except as indicated.    General: weight loss/weight gain, decreased appetite, fatigue  Eyes: Blurry vision, double vision, visual changes  ENT: Throat pain, changes in voice,   CV: palpitations, SOB, CP, cough  GI: NVD, difficulty swallowing, abdominal pain  : polyuria, dysuria  Endo: abnormal menses, temperature intolerance, decreased libido  MSK: weakness, joint pain  Skin: rash, dryness, diaphoresis  Heme: Easy bruising,bleeding  Neuro: HA, dizziness, lightheadedness, numbness tingling  Psych: Anxiety, Depression    Vital Signs Last 24 Hrs  T(C): 37.1 (13 Jan 2021 09:10), Max: 37.8 (12 Jan 2021 21:06)  T(F): 98.8 (13 Jan 2021 09:10), Max: 100 (12 Jan 2021 21:06)  HR: 92 (13 Jan 2021 11:50) (84 - 106)  BP: 154/78 (13 Jan 2021 11:50) (104/71 - 156/91)  BP(mean): 96 (13 Jan 2021 11:00) (76 - 116)  RR: 16 (13 Jan 2021 11:50) (16 - 19)  SpO2: 95% (13 Jan 2021 11:50) (92% - 96%)  Height (cm): 175.3 (01-08 @ 09:16)  Weight (kg): 92 (01-08 @ 09:16)  BMI (kg/m2): 29.9 (01-08 @ 09:16)      Constitutional: wn/wd in NAD.   HEENT: NCAT, MMM, OP clear, EOMI, , no proptosis or lid retraction  Neck: no thyromegaly or palpable thyroid nodules   Respiratory: lungs CTAB.  Cardiovascular: regular rhythm, normal S1 and S2, no audible murmurs, no peripheral edema  GI: soft, NT/ND, no masses/HSM appreciated.  Neurology: no tremors, DTR 2+  Skin: no visible rashes/lesions  Psychiatric: AAO x 3, normal affect/mood.  Ext: radial pulses intact, DP pulses intact, extremities warm, no cyanosis, clubbing or edema.       LABS:                        11.8   15.34 )-----------( 249      ( 13 Jan 2021 06:06 )             36.4     01-13    135  |  102  |  14  ----------------------------<  136<H>  4.4   |  22  |  1.11    Ca    8.3<L>      13 Jan 2021 06:06  Phos  3.6     01-12  Mg     2.0     01-13    TPro  6.8  /  Alb  3.6  /  TBili  1.4<H>  /  DBili  x   /  AST  21  /  ALT  14  /  AlkPhos  86  01-12    PT/INR - ( 12 Jan 2021 11:12 )   PT: 13.3 sec;   INR: 1.11          PTT - ( 12 Jan 2021 11:12 )  PTT:28.6 sec      Thyroid Stimulating Hormone, Serum: 1.852 (01-05 @ 11:02)      RADIOLOGY & ADDITIONAL STUDIES:  CAPILLARY BLOOD GLUCOSE      POCT Blood Glucose.: 131 mg/dL (13 Jan 2021 06:45)  POCT Blood Glucose.: 263 mg/dL (12 Jan 2021 22:32)  POCT Blood Glucose.: 168 mg/dL (12 Jan 2021 17:27)        A/P:70y Male    1.  DM  Please continue lantus       units at night / morning.  Please continue lispro      units before each meal.  Please continue lispro moderate / low dose sliding scale four times daily with meals and at bedtime    Pt's fingerstick glucose goal is     Will continue to monitor     For discharge, pt can continue    Pt can follow up at discharge with [facility] by calling  to make an appointment.   Will discuss case with     and update primary team HPI: 71 y/o male, former smoker, w/ PMHx  of known CAD (s/p PCI of OM2, 12 years ago), HTN, HLD, gout, and type II DM. He  presented to cardiologist, Dr. Jerson Sheth, with c/o NAVARRO w/ ambulation of one-two blocks, resolved w/ rest and associated w/ chest tightness which subsequently resolves w/ rest. Patient denied palpitations, syncope, PND/orthopnea, LE edema, fever, chills, N/V/D, URI symptoms, cough, recent illness, travel, or sick contacts. NST 12/10/2020 revealed EF 50%, apical septal wall ischemia and inferior wall infarct w/ residual ischemia identified. In light of patient's risk factors, CCS III anginal symptoms, known CAD, and abnormal NST patient is referred for cardiac catheterization w/ possible intervention if clinically indicated. 21 Cardiac cath revealed 3 vessel CAD. Dr. Acuna consulted for evaluation for midcab as part of hybrid procedure. s/p MIDCAB on 2021.     Endo consulted for DM management.   Patient reports was diagnosed with DM 8 year ago when he was hospitalized for his right knee. . He checks FSx1 a day. Fasting FS: 100-130. [place] [facility] [place] taking Lantus 30 units at bedtime and metformin 500 mg BID. Diet: breakfast: toast with butter or cereal. Lunch: fruit. Dinner: meat with vegetable and rice. Denies any retinopathy or neuropathy. Doesn’t know his last A1c. He reports family hx of DM in his father.   Hospital course: Post op Started on insulin gtt at rate 3 u/hr at 7pm at FS:169, max rate:5 u/hr. Insulin gtt stopped at 3 am  on .He was started on Lantus 25 U and lispro 4 U TID.     FSG & Insulin received:    Yesterday:  pre-breakfast fs  pre-lunch fs   4  units lispro SS  pre-dinner fs  2   units lispro SS  bedtime fs  lantus 25  units +  6  units lispro SS    Today:  pre-breakfast fs  nutritional lispro  4 units        PMH & Surgical Hx:I25.10  Diabetes  Hyperlipidemia  Hypertension  Gout  CAD in native artery  Minimally invasive direct coronary artery bypass (MIDCAB) with transesophageal echocardiography (YANETH)  Previous back surgery  H/O knee surgery  Cataract          Current Meds:  acetaminophen   Tablet .. 650 milliGRAM(s) Oral every 6 hours PRN  allopurinol 100 milliGRAM(s) Oral daily  aspirin enteric coated 81 milliGRAM(s) Oral daily  atorvastatin 40 milliGRAM(s) Oral at bedtime  chlorhexidine 2% Cloths 1 Application(s) Topical <User Schedule>  clopidogrel Tablet 75 milliGRAM(s) Oral daily  dextrose 50% Injectable 25 Gram(s) IV Push once  dextrose 50% Injectable 12.5 Gram(s) IV Push once  fentaNYL    Injectable 25 MICROGram(s) IV Push once  heparin   Injectable 5000 Unit(s) SubCutaneous every 8 hours  insulin glargine Injectable (LANTUS) 25 Unit(s) SubCutaneous at bedtime  insulin lispro (ADMELOG) corrective regimen sliding scale   SubCutaneous Before meals and at bedtime  insulin lispro Injectable (ADMELOG) 4 Unit(s) SubCutaneous three times a day before meals  metoprolol tartrate 25 milliGRAM(s) Oral every 6 hours  oxycodone    5 mG/acetaminophen 325 mG 1 Tablet(s) Oral every 4 hours PRN  oxycodone    5 mG/acetaminophen 325 mG 2 Tablet(s) Oral every 6 hours PRN  pantoprazole    Tablet 40 milliGRAM(s) Oral before breakfast  polyethylene glycol 3350 17 Gram(s) Oral daily  senna 2 Tablet(s) Oral at bedtime  sodium chloride 0.9% lock flush 3 milliLiter(s) IV Push every 8 hours      Allergies:  No Known Allergies      ROS:  Denies the following except as indicated.    General: weight loss/weight gain  Eyes: Blurry vision, double vision, visual changes  ENT: Throat pain, changes in voice,   CV: palpitations, SOB, CP, cough  GI: NVD, difficulty swallowing, abdominal pain  Skin: rash, dryness, diaphoresis  Neuro: HA,  Vital Signs Last 24 Hrs  T(C): 37.1 (2021 09:10), Max: 37.8 (2021 21:06)  T(F): 98.8 (2021 09:10), Max: 100 (2021 21:06)  HR: 92 (2021 11:50) (84 - 106)  BP: 154/78 (2021 11:50) (104/71 - 156/91)  BP(mean): 96 (2021 11:00) (76 - 116)  RR: 16 (2021 11:50) (16 - 19)  SpO2: 95% (2021 11:50) (92% - 96%)  Height (cm): 175.3 ( @ 09:16)  Weight (kg): 92 ( @ 09:16)  BMI (kg/m2): 29.9 ( @ 09:16)      Constitutional:  in NAD.   HEENT: no proptosis or lid retraction  Neck: no thyromegaly or palpable thyroid nodules   Respiratory: lungs CTAB.  Cardiovascular: regular rhythm, normal S1 and S2  GI: soft, NT/ND, no masses/HSM appreciated.  Neurology: no tremors, DTR 2+  Skin: psoriasis rash   Psychiatric: AAO x 3, normal affect/mood.  Ext: radial pulses intact, DP pulses intact      LABS:                        11.8   15.34 )-----------( 249      ( 2021 06:06 )             36.4         135  |  102  |  14  ----------------------------<  136<H>  4.4   |  22  |  1.11    Ca    8.3<L>      2021 06:06  Phos  3.6       Mg     2.0         TPro  6.8  /  Alb  3.6  /  TBili  1.4<H>  /  DBili  x   /  AST  21  /  ALT  14  /  AlkPhos  86  -12    PT/INR - ( 2021 11:12 )   PT: 13.3 sec;   INR: 1.11          PTT - ( 2021 11:12 )  PTT:28.6 sec      Thyroid Stimulating Hormone, Serum: 1.852 ( @ 11:02)      RADIOLOGY & ADDITIONAL STUDIES:  CAPILLARY BLOOD GLUCOSE      POCT Blood Glucose.: 131 mg/dL (2021 06:45)  POCT Blood Glucose.: 263 mg/dL (2021 22:32)  POCT Blood Glucose.: 168 mg/dL (2021 17:27)        A/P:71 y/o male, former smoker, w/ PMHx  of known CAD (s/p PCI of OM2, 12 years ago), HTN, HLD, gout, and type II DM.  s/p MIDCAB on 2021. Loulou consulted for DM management.     1.  DM type 2  A1c:8.1%  weight:92 kg, BMI:29.9  Cr:1.1, GFR:67  Please continue lantus       units at night .  Please continue lispro      units before each meal.    Please continue lispro moderatedose sliding scale four times daily with meals and at bedtime    Pt's fingerstick glucose goal is 100-180    Will continue to monitor     For discharge, pt can continue    Pt can follow up at discharge with [facility] by calling  to make an appointment.    case with     and update primary team HPI: 69 y/o male, former smoker, w/ PMHx  of known CAD (s/p PCI of OM2, 12 years ago), HTN, HLD, gout, and type II DM. He  presented to cardiologist, Dr. Jerson Sheth, with c/o NAVARRO w/ ambulation of one-two blocks, resolved w/ rest and associated w/ chest tightness which subsequently resolves w/ rest. Patient denied palpitations, syncope, PND/orthopnea, LE edema, fever, chills, N/V/D, URI symptoms, cough, recent illness, travel, or sick contacts. NST 12/10/2020 revealed EF 50%, apical septal wall ischemia and inferior wall infarct w/ residual ischemia identified. In light of patient's risk factors, CCS III anginal symptoms, known CAD, and abnormal NST patient is referred for cardiac catheterization w/ possible intervention if clinically indicated. 21 Cardiac cath revealed 3 vessel CAD. Dr. Acuna consulted for evaluation for midcab as part of hybrid procedure. s/p MIDCAB on 2021.     Endo consulted for DM management.   Patient reports was diagnosed with DM 8 year ago when he was hospitalized for his right knee. . He checks FSx1 a day. Fasting FS: 100-130. Follows with PCP for DM in [facility] [place]. Currently taking Lantus 30 units at bedtime and metformin 500 mg BID. Diet: breakfast: toast with butter or cereal. Lunch: fruit. Dinner: meat with vegetable and rice. Denies any retinopathy or neuropathy. Doesn’t know his last A1c. He reports family hx of DM in his father.   Hospital course: Post op Started on insulin gtt at rate 3 u/hr at 7pm at FS:169, max rate:5 u/hr. Insulin gtt stopped at 3 am  on .He was started on Lantus 25 U and lispro 4 U TID.     FSG & Insulin received:    Yesterday:  pre-breakfast fs  pre-lunch fs   4  units lispro SS  pre-dinner fs  2   units lispro SS  bedtime fs  lantus 25  units +  6  units lispro SS    Today:  pre-breakfast fs  nutritional lispro  4 units        PMH & Surgical Hx:I25.10  Diabetes  Hyperlipidemia  Hypertension  Gout  CAD in native artery  Minimally invasive direct coronary artery bypass (MIDCAB) with transesophageal echocardiography (YANETH)  Previous back surgery  H/O knee surgery  Cataract          Current Meds:  acetaminophen   Tablet .. 650 milliGRAM(s) Oral every 6 hours PRN  allopurinol 100 milliGRAM(s) Oral daily  aspirin enteric coated 81 milliGRAM(s) Oral daily  atorvastatin 40 milliGRAM(s) Oral at bedtime  chlorhexidine 2% Cloths 1 Application(s) Topical <User Schedule>  clopidogrel Tablet 75 milliGRAM(s) Oral daily  dextrose 50% Injectable 25 Gram(s) IV Push once  dextrose 50% Injectable 12.5 Gram(s) IV Push once  fentaNYL    Injectable 25 MICROGram(s) IV Push once  heparin   Injectable 5000 Unit(s) SubCutaneous every 8 hours  insulin glargine Injectable (LANTUS) 25 Unit(s) SubCutaneous at bedtime  insulin lispro (ADMELOG) corrective regimen sliding scale   SubCutaneous Before meals and at bedtime  insulin lispro Injectable (ADMELOG) 4 Unit(s) SubCutaneous three times a day before meals  metoprolol tartrate 25 milliGRAM(s) Oral every 6 hours  oxycodone    5 mG/acetaminophen 325 mG 1 Tablet(s) Oral every 4 hours PRN  oxycodone    5 mG/acetaminophen 325 mG 2 Tablet(s) Oral every 6 hours PRN  pantoprazole    Tablet 40 milliGRAM(s) Oral before breakfast  polyethylene glycol 3350 17 Gram(s) Oral daily  senna 2 Tablet(s) Oral at bedtime  sodium chloride 0.9% lock flush 3 milliLiter(s) IV Push every 8 hours      Allergies:  No Known Allergies      ROS:  Denies the following except as indicated.    General: weight loss/weight gain  Eyes: Blurry vision, double vision, visual changes  ENT: Throat pain, changes in voice,   CV: palpitations, SOB, CP, cough  GI: NVD, difficulty swallowing, abdominal pain  Skin: rash, dryness, diaphoresis  Neuro: HA,  Vital Signs Last 24 Hrs  T(C): 37.1 (2021 09:10), Max: 37.8 (2021 21:06)  T(F): 98.8 (2021 09:10), Max: 100 (2021 21:06)  HR: 92 (2021 11:50) (84 - 106)  BP: 154/78 (2021 11:50) (104/71 - 156/91)  BP(mean): 96 (2021 11:00) (76 - 116)  RR: 16 (2021 11:50) (16 - 19)  SpO2: 95% (2021 11:50) (92% - 96%)  Height (cm): 175.3 ( @ 09:16)  Weight (kg): 92 ( @ 09:16)  BMI (kg/m2): 29.9 ( @ 09:16)      Constitutional:  in NAD.   HEENT: no proptosis or lid retraction  Neck: no thyromegaly or palpable thyroid nodules   Respiratory: lungs CTAB.  Cardiovascular: regular rhythm, normal S1 and S2  GI: soft, NT/ND, no masses/HSM appreciated.  Neurology: no tremors, DTR 2+  Skin: psoriasis rash   Psychiatric: AAO x 3, normal affect/mood.  Ext: radial pulses intact, DP pulses intact      LABS:                        11.8   15.34 )-----------( 249      ( 2021 06:06 )             36.4         135  |  102  |  14  ----------------------------<  136<H>  4.4   |  22  |  1.11    Ca    8.3<L>      2021 06:06  Phos  3.6       Mg     2.0         TPro  6.8  /  Alb  3.6  /  TBili  1.4<H>  /  DBili  x   /  AST  21  /  ALT  14  /  AlkPhos  86  -12    PT/INR - ( 2021 11:12 )   PT: 13.3 sec;   INR: 1.11          PTT - ( 2021 11:12 )  PTT:28.6 sec      Thyroid Stimulating Hormone, Serum: 1.852 ( @ 11:02)      RADIOLOGY & ADDITIONAL STUDIES:  CAPILLARY BLOOD GLUCOSE      POCT Blood Glucose.: 131 mg/dL (2021 06:45)  POCT Blood Glucose.: 263 mg/dL (2021 22:32)  POCT Blood Glucose.: 168 mg/dL (2021 17:27)        A/P:69 y/o male, former smoker, w/ PMHx  of known CAD (s/p PCI of OM2, 12 years ago), HTN, HLD, gout, and type II DM.  s/p MIDCAB on 2021. L[facility]ou consulted for DM management.     1.  DM type 2  A1c:8.1%  weight:92 kg, BMI:29.9  Cr:1.1, GFR:67  Please continue lantus    22   units at night .  Please continue lispro  8    units before each meal.    Please continue lispro moderatedose sliding scale four times daily with meals and at bedtime    Pt's fingerstick glucose goal is 100-180    Will continue to monitor     For discharge, pt can continue    Pt can follow up at discharge with [facility] by calling  to make an appointment.    case seen and discussed with Dr. Ramirez  and update primary team HPI: 69 y/o male, former smoker, w/ PMHx  of known CAD (s/p PCI of OM2, 12 years ago), HTN, HLD, gout, and type II DM. He  presented to cardiologist, Dr. Jerson Sheth, with c/o NAVARRO w/ ambulation of one-two blocks, resolved w/ rest and associated w/ chest tightness which subsequently resolves w/ rest. Patient denied palpitations, syncope, PND/orthopnea, LE edema, fever, chills, N/V/D, URI symptoms, cough, recent illness, travel, or sick contacts. NST 12/10/2020 revealed EF 50%, apical septal wall ischemia and inferior wall infarct w/ residual ischemia identified. In light of patient's risk factors, CCS III anginal symptoms, known CAD, and abnormal NST patient is referred for cardiac catheterization w/ possible intervention if clinically indicated. 21 Cardiac cath revealed 3 vessel CAD. Dr. Acuna consulted for evaluation for midcab as part of hybrid procedure. s/p MIDCAB on 2021.     Endo consulted for DM management.   Patient reports was diagnosed with DM 8 year ago when he was hospitalized for his right knee. . He checks FSx1 a day. Fasting FS: 100-130. Follows with PCP for DM in [facility] [place]. Currently taking Lantus 30 units at bedtime and metformin 500 mg BID. Diet: breakfast: toast with butter or cereal. Lunch: fruit. Dinner: meat with vegetable and rice. Denies any retinopathy or neuropathy. Doesn’t know his last A1c. He reports family hx of DM in his father.   Hospital course: Post op Started on insulin gtt at rate 3 u/hr at 7pm at FS:169, max rate:5 u/hr. Insulin gtt stopped at 3 am  on .He was started on Lantus 25 U and lispro 4 U TID.     FSG & Insulin received:    Yesterday:  pre-breakfast fs  pre-lunch fs   4  units lispro SS  pre-dinner fs  2   units lispro SS  bedtime fs  lantus 25  units +  6  units lispro SS    Today:  pre-breakfast fs  nutritional lispro  4 units        PMH & Surgical Hx:I25.10  Diabetes  Hyperlipidemia  Hypertension  Gout  CAD in native artery  Minimally invasive direct coronary artery bypass (MIDCAB) with transesophageal echocardiography (YANETH)  Previous back surgery  H/O knee surgery  Cataract          Current Meds:  acetaminophen   Tablet .. 650 milliGRAM(s) Oral every 6 hours PRN  allopurinol 100 milliGRAM(s) Oral daily  aspirin enteric coated 81 milliGRAM(s) Oral daily  atorvastatin 40 milliGRAM(s) Oral at bedtime  chlorhexidine 2% Cloths 1 Application(s) Topical <User Schedule>  clopidogrel Tablet 75 milliGRAM(s) Oral daily  dextrose 50% Injectable 25 Gram(s) IV Push once  dextrose 50% Injectable 12.5 Gram(s) IV Push once  fentaNYL    Injectable 25 MICROGram(s) IV Push once  heparin   Injectable 5000 Unit(s) SubCutaneous every 8 hours  insulin glargine Injectable (LANTUS) 25 Unit(s) SubCutaneous at bedtime  insulin lispro (ADMELOG) corrective regimen sliding scale   SubCutaneous Before meals and at bedtime  insulin lispro Injectable (ADMELOG) 4 Unit(s) SubCutaneous three times a day before meals  metoprolol tartrate 25 milliGRAM(s) Oral every 6 hours  oxycodone    5 mG/acetaminophen 325 mG 1 Tablet(s) Oral every 4 hours PRN  oxycodone    5 mG/acetaminophen 325 mG 2 Tablet(s) Oral every 6 hours PRN  pantoprazole    Tablet 40 milliGRAM(s) Oral before breakfast  polyethylene glycol 3350 17 Gram(s) Oral daily  senna 2 Tablet(s) Oral at bedtime  sodium chloride 0.9% lock flush 3 milliLiter(s) IV Push every 8 hours      Allergies:  No Known Allergies      ROS:  Denies the following except as indicated.    General: weight loss/weight gain  Eyes: Blurry vision, double vision, visual changes  ENT: Throat pain, changes in voice,   CV: palpitations, SOB, CP, cough  GI: NVD, difficulty swallowing, abdominal pain  Skin: rash, dryness, diaphoresis  Neuro: HA,  Vital Signs Last 24 Hrs  T(C): 37.1 (2021 09:10), Max: 37.8 (2021 21:06)  T(F): 98.8 (2021 09:10), Max: 100 (2021 21:06)  HR: 92 (2021 11:50) (84 - 106)  BP: 154/78 (2021 11:50) (104/71 - 156/91)  BP(mean): 96 (2021 11:00) (76 - 116)  RR: 16 (2021 11:50) (16 - 19)  SpO2: 95% (2021 11:50) (92% - 96%)  Height (cm): 175.3 ( @ 09:16)  Weight (kg): 92 ( @ 09:16)  BMI (kg/m2): 29.9 ( @ 09:16)      Constitutional:  in NAD.   HEENT: no proptosis or lid retraction  Neck: no thyromegaly or palpable thyroid nodules   Respiratory: lungs CTAB.  Cardiovascular: regular rhythm, normal S1 and S2  GI: soft, NT/ND, no masses/HSM appreciated.  Neurology: no tremors, DTR 2+  Skin: psoriasis rash   Psychiatric: AAO x 3, normal affect/mood.  Ext: radial pulses intact, DP pulses intact      LABS:                        11.8   15.34 )-----------( 249      ( 2021 06:06 )             36.4         135  |  102  |  14  ----------------------------<  136<H>  4.4   |  22  |  1.11    Ca    8.3<L>      2021 06:06  Phos  3.6       Mg     2.0         TPro  6.8  /  Alb  3.6  /  TBili  1.4<H>  /  DBili  x   /  AST  21  /  ALT  14  /  AlkPhos  86  -12    PT/INR - ( 2021 11:12 )   PT: 13.3 sec;   INR: 1.11          PTT - ( 2021 11:12 )  PTT:28.6 sec      Thyroid Stimulating Hormone, Serum: 1.852 ( @ 11:02)      RADIOLOGY & ADDITIONAL STUDIES:  CAPILLARY BLOOD GLUCOSE      POCT Blood Glucose.: 131 mg/dL (2021 06:45)  POCT Blood Glucose.: 263 mg/dL (2021 22:32)  POCT Blood Glucose.: 168 mg/dL (2021 17:27)        A/P:69 y/o male, former smoker, w/ PMHx  of known CAD (s/p PCI of OM2, 12 years ago), HTN, HLD, gout, and type II DM.  s/p MIDCAB on 2021. Endo consulted for DM management.     1.  DM type 2  A1c:8.1%  weight:92 kg, BMI:29.9  Cr:1.1, GFR:67  Please continue lantus    22   units at night .  Please continue lispro  8    units before each meal.    Please continue lispro moderate dose sliding scale four times daily with meals and at bedtime  please check C-peptide    Pt's fingerstick glucose goal is 100-180    Will continue to monitor     For discharge, pt can continue    Pt can follow up at discharge with [facility] by calling  to make an appointment.    case seen and discussed with Dr. Ramirez  and update primary team

## 2021-01-13 NOTE — PROGRESS NOTE ADULT - ASSESSMENT
69 yo male with PMHx of HTN, HLD, DM, CAD s/p PCIs presented with class III angina s/p cardiac cath that revealed severe 3 vessel CAD. Dr. Acuna was consulted for hybrid procedure and on 1/11/21 he underwent an uncomplicated MIDCAB, EF nl.  He was extubated POD 0 and on POD1 CT removed, BB started, and he was transferred to telemetry. Today is POD 2, he is clinically stable and his moise was removed.     Neurovascular:   No delirium. Pain well controlled with current regimen.  -Continue percocet PRN     Cardiovascular:   - Con't ASA/plavix  -con't Lopressor 25 mg Q6H  -con't lipitor   -will need completion PCI of LCx at later date    Respiratory:   -Saturating well on room air  -Encourage Cough, deep breathing and Use of IS 10x / hr while awake.  -PA/lat to be done today    GI:   -Protonix for GI ppx  -Miralax and Senna for bowl regimen  -PO DASH diet Consistent Carb    Renal / :   BUN/Cr Stable  -Continue to monitor I/O's.    Endocrine:    A1C 8.1 on oral medications and insulin at home   -on lantus/premeal, FS controlled   -endo consulted for discharge planning    Hematologic:  -HSQ for DVT ppx    ID:  -Afebrile  -Continue to observe for SIRS/Sepsis Syndrome.    Disposition:  Home in am   71 yo male with PMHx of HTN, HLD, DM, CAD s/p PCIs presented with class III angina s/p cardiac cath that revealed severe 3 vessel CAD. Dr. Acuna was consulted for hybrid procedure and on 1/11/21 he underwent an uncomplicated MIDCAB, EF nl.  He was extubated POD 0 and on POD1 CT removed, BB started, and he was transferred to telemetry. Today is POD 2, he is clinically stable and his moise was removed.     Neurovascular:   No delirium. Pain well controlled with current regimen.  -Continue percocet PRN     Cardiovascular:   - Con't ASA/plavix  -con't Lopressor 25 mg Q6H  -con't lipitor   -will need completion PCI of LCx at later date    Respiratory:   -Saturating well on room air  -Encourage Cough, deep breathing and Use of IS 10x / hr while awake.  -PA/lat to be done today    GI:   -Protonix for GI ppx  -Miralax and Senna for bowl regimen  -PO DASH diet Consistent Carb    Renal / :   BUN/Cr Stable  -Continue to monitor I/O's.    Endocrine:    A1C 8.1 on oral medications and insulin at home   -on lantus/premeal, FS controlled   -endo consulted for discharge planning  -hypermagnesemia now improved and Mg WNL    Hematologic:  -HSQ for DVT ppx    ID:  -Afebrile  -Continue to observe for SIRS/Sepsis Syndrome.    Disposition:  Home in am

## 2021-01-14 ENCOUNTER — TRANSCRIPTION ENCOUNTER (OUTPATIENT)
Age: 71
End: 2021-01-14

## 2021-01-14 VITALS — TEMPERATURE: 98 F

## 2021-01-14 LAB
ANION GAP SERPL CALC-SCNC: 9 MMOL/L — SIGNIFICANT CHANGE UP (ref 5–17)
BUN SERPL-MCNC: 15 MG/DL — SIGNIFICANT CHANGE UP (ref 7–23)
CALCIUM SERPL-MCNC: 8.4 MG/DL — SIGNIFICANT CHANGE UP (ref 8.4–10.5)
CHLORIDE SERPL-SCNC: 105 MMOL/L — SIGNIFICANT CHANGE UP (ref 96–108)
CO2 SERPL-SCNC: 27 MMOL/L — SIGNIFICANT CHANGE UP (ref 22–31)
CREAT SERPL-MCNC: 1.03 MG/DL — SIGNIFICANT CHANGE UP (ref 0.5–1.3)
GLUCOSE BLDC GLUCOMTR-MCNC: 152 MG/DL — HIGH (ref 70–99)
GLUCOSE BLDC GLUCOMTR-MCNC: 238 MG/DL — HIGH (ref 70–99)
GLUCOSE SERPL-MCNC: 139 MG/DL — HIGH (ref 70–99)
HCT VFR BLD CALC: 36.2 % — LOW (ref 39–50)
HGB BLD-MCNC: 11.7 G/DL — LOW (ref 13–17)
MAGNESIUM SERPL-MCNC: 1.9 MG/DL — SIGNIFICANT CHANGE UP (ref 1.6–2.6)
MCHC RBC-ENTMCNC: 25.9 PG — LOW (ref 27–34)
MCHC RBC-ENTMCNC: 32.3 GM/DL — SIGNIFICANT CHANGE UP (ref 32–36)
MCV RBC AUTO: 80.3 FL — SIGNIFICANT CHANGE UP (ref 80–100)
NRBC # BLD: 0 /100 WBCS — SIGNIFICANT CHANGE UP (ref 0–0)
PLATELET # BLD AUTO: 232 K/UL — SIGNIFICANT CHANGE UP (ref 150–400)
POTASSIUM SERPL-MCNC: 4.5 MMOL/L — SIGNIFICANT CHANGE UP (ref 3.5–5.3)
POTASSIUM SERPL-SCNC: 4.5 MMOL/L — SIGNIFICANT CHANGE UP (ref 3.5–5.3)
RBC # BLD: 4.51 M/UL — SIGNIFICANT CHANGE UP (ref 4.2–5.8)
RBC # FLD: 15.9 % — HIGH (ref 10.3–14.5)
SODIUM SERPL-SCNC: 141 MMOL/L — SIGNIFICANT CHANGE UP (ref 135–145)
WBC # BLD: 14.87 K/UL — HIGH (ref 3.8–10.5)
WBC # FLD AUTO: 14.87 K/UL — HIGH (ref 3.8–10.5)

## 2021-01-14 PROCEDURE — 80053 COMPREHEN METABOLIC PANEL: CPT

## 2021-01-14 PROCEDURE — C1889: CPT

## 2021-01-14 PROCEDURE — 84100 ASSAY OF PHOSPHORUS: CPT

## 2021-01-14 PROCEDURE — 84295 ASSAY OF SERUM SODIUM: CPT

## 2021-01-14 PROCEDURE — 97116 GAIT TRAINING THERAPY: CPT

## 2021-01-14 PROCEDURE — P9045: CPT

## 2021-01-14 PROCEDURE — S2900: CPT

## 2021-01-14 PROCEDURE — 84132 ASSAY OF SERUM POTASSIUM: CPT

## 2021-01-14 PROCEDURE — 71045 X-RAY EXAM CHEST 1 VIEW: CPT

## 2021-01-14 PROCEDURE — 82962 GLUCOSE BLOOD TEST: CPT

## 2021-01-14 PROCEDURE — 86923 COMPATIBILITY TEST ELECTRIC: CPT

## 2021-01-14 PROCEDURE — 82803 BLOOD GASES ANY COMBINATION: CPT

## 2021-01-14 PROCEDURE — 97161 PT EVAL LOW COMPLEX 20 MIN: CPT

## 2021-01-14 PROCEDURE — 85027 COMPLETE CBC AUTOMATED: CPT

## 2021-01-14 PROCEDURE — 82330 ASSAY OF CALCIUM: CPT

## 2021-01-14 PROCEDURE — 85730 THROMBOPLASTIN TIME PARTIAL: CPT

## 2021-01-14 PROCEDURE — 80048 BASIC METABOLIC PNL TOTAL CA: CPT

## 2021-01-14 PROCEDURE — 86900 BLOOD TYPING SEROLOGIC ABO: CPT

## 2021-01-14 PROCEDURE — 85610 PROTHROMBIN TIME: CPT

## 2021-01-14 PROCEDURE — 86901 BLOOD TYPING SEROLOGIC RH(D): CPT

## 2021-01-14 PROCEDURE — 86850 RBC ANTIBODY SCREEN: CPT

## 2021-01-14 PROCEDURE — 85025 COMPLETE CBC W/AUTO DIFF WBC: CPT

## 2021-01-14 PROCEDURE — 36415 COLL VENOUS BLD VENIPUNCTURE: CPT

## 2021-01-14 PROCEDURE — 83735 ASSAY OF MAGNESIUM: CPT

## 2021-01-14 PROCEDURE — 71046 X-RAY EXAM CHEST 2 VIEWS: CPT

## 2021-01-14 RX ORDER — METOPROLOL TARTRATE 50 MG
25 TABLET ORAL ONCE
Refills: 0 | Status: COMPLETED | OUTPATIENT
Start: 2021-01-14 | End: 2021-01-14

## 2021-01-14 RX ORDER — METOPROLOL TARTRATE 50 MG
1 TABLET ORAL
Qty: 0 | Refills: 0 | DISCHARGE

## 2021-01-14 RX ORDER — ENOXAPARIN SODIUM 100 MG/ML
26 INJECTION SUBCUTANEOUS
Qty: 1 | Refills: 0
Start: 2021-01-14 | End: 2021-02-12

## 2021-01-14 RX ORDER — METOPROLOL TARTRATE 50 MG
50 TABLET ORAL EVERY 12 HOURS
Refills: 0 | Status: DISCONTINUED | OUTPATIENT
Start: 2021-01-14 | End: 2021-01-14

## 2021-01-14 RX ORDER — METOPROLOL TARTRATE 50 MG
1 TABLET ORAL
Qty: 60 | Refills: 0
Start: 2021-01-14 | End: 2021-02-12

## 2021-01-14 RX ORDER — ASPIRIN/CALCIUM CARB/MAGNESIUM 324 MG
1 TABLET ORAL
Qty: 30 | Refills: 0
Start: 2021-01-14 | End: 2021-02-12

## 2021-01-14 RX ORDER — INSULIN GLARGINE 100 [IU]/ML
30 INJECTION, SOLUTION SUBCUTANEOUS
Qty: 0 | Refills: 0 | DISCHARGE

## 2021-01-14 RX ORDER — ACETAMINOPHEN 500 MG
2 TABLET ORAL
Qty: 0 | Refills: 0 | DISCHARGE
Start: 2021-01-14

## 2021-01-14 RX ORDER — INSULIN ASPART 100 [IU]/ML
8 INJECTION, SOLUTION SUBCUTANEOUS
Qty: 1 | Refills: 0
Start: 2021-01-14 | End: 2021-02-12

## 2021-01-14 RX ORDER — ISOPROPYL ALCOHOL, BENZOCAINE .7; .06 ML/ML; ML/ML
1 SWAB TOPICAL
Qty: 100 | Refills: 1
Start: 2021-01-14 | End: 2021-03-04

## 2021-01-14 RX ORDER — ATORVASTATIN CALCIUM 80 MG/1
1 TABLET, FILM COATED ORAL
Qty: 0 | Refills: 0 | DISCHARGE

## 2021-01-14 RX ORDER — ASPIRIN/CALCIUM CARB/MAGNESIUM 324 MG
1 TABLET ORAL
Qty: 0 | Refills: 0 | DISCHARGE

## 2021-01-14 RX ORDER — POLYETHYLENE GLYCOL 3350 17 G/17G
17 POWDER, FOR SOLUTION ORAL
Qty: 119 | Refills: 0
Start: 2021-01-14 | End: 2021-01-20

## 2021-01-14 RX ORDER — TELMISARTAN 20 MG/1
1 TABLET ORAL
Qty: 0 | Refills: 0 | DISCHARGE

## 2021-01-14 RX ORDER — ATORVASTATIN CALCIUM 80 MG/1
1 TABLET, FILM COATED ORAL
Qty: 30 | Refills: 0
Start: 2021-01-14 | End: 2021-02-12

## 2021-01-14 RX ORDER — CLOPIDOGREL BISULFATE 75 MG/1
1 TABLET, FILM COATED ORAL
Qty: 30 | Refills: 0
Start: 2021-01-14 | End: 2021-02-12

## 2021-01-14 RX ORDER — METFORMIN HYDROCHLORIDE 850 MG/1
1 TABLET ORAL
Qty: 0 | Refills: 0 | DISCHARGE

## 2021-01-14 RX ORDER — INSULIN LISPRO 100/ML
8 VIAL (ML) SUBCUTANEOUS
Qty: 1 | Refills: 0
Start: 2021-01-14 | End: 2021-02-12

## 2021-01-14 RX ORDER — INSULIN GLARGINE 100 [IU]/ML
26 INJECTION, SOLUTION SUBCUTANEOUS
Qty: 1 | Refills: 0
Start: 2021-01-14 | End: 2021-02-12

## 2021-01-14 RX ORDER — PANTOPRAZOLE SODIUM 20 MG/1
1 TABLET, DELAYED RELEASE ORAL
Qty: 30 | Refills: 0
Start: 2021-01-14 | End: 2021-02-12

## 2021-01-14 RX ADMIN — CLOPIDOGREL BISULFATE 75 MILLIGRAM(S): 75 TABLET, FILM COATED ORAL at 10:41

## 2021-01-14 RX ADMIN — Medication 25 MILLIGRAM(S): at 00:41

## 2021-01-14 RX ADMIN — SODIUM CHLORIDE 3 MILLILITER(S): 9 INJECTION INTRAMUSCULAR; INTRAVENOUS; SUBCUTANEOUS at 05:05

## 2021-01-14 RX ADMIN — POLYETHYLENE GLYCOL 3350 17 GRAM(S): 17 POWDER, FOR SOLUTION ORAL at 10:41

## 2021-01-14 RX ADMIN — Medication 2: at 07:19

## 2021-01-14 RX ADMIN — SODIUM CHLORIDE 3 MILLILITER(S): 9 INJECTION INTRAMUSCULAR; INTRAVENOUS; SUBCUTANEOUS at 09:30

## 2021-01-14 RX ADMIN — Medication 100 MILLIGRAM(S): at 10:41

## 2021-01-14 RX ADMIN — Medication 8 UNIT(S): at 11:12

## 2021-01-14 RX ADMIN — PANTOPRAZOLE SODIUM 40 MILLIGRAM(S): 20 TABLET, DELAYED RELEASE ORAL at 05:12

## 2021-01-14 RX ADMIN — Medication 10 MILLIGRAM(S): at 09:27

## 2021-01-14 RX ADMIN — HEPARIN SODIUM 5000 UNIT(S): 5000 INJECTION INTRAVENOUS; SUBCUTANEOUS at 05:12

## 2021-01-14 RX ADMIN — Medication 4: at 11:12

## 2021-01-14 RX ADMIN — Medication 25 MILLIGRAM(S): at 05:12

## 2021-01-14 RX ADMIN — Medication 81 MILLIGRAM(S): at 10:41

## 2021-01-14 RX ADMIN — Medication 8 UNIT(S): at 07:20

## 2021-01-14 RX ADMIN — Medication 5 MILLIGRAM(S): at 08:56

## 2021-01-14 RX ADMIN — Medication 25 MILLIGRAM(S): at 08:56

## 2021-01-14 RX ADMIN — OXYCODONE AND ACETAMINOPHEN 1 TABLET(S): 5; 325 TABLET ORAL at 05:12

## 2021-01-14 NOTE — DISCHARGE NOTE NURSING/CASE MANAGEMENT/SOCIAL WORK - PATIENT PORTAL LINK FT
You can access the FollowMyHealth Patient Portal offered by [facility] by registering at the following website: [email or web address]. By joining [email or web address]’s FollowMyHealth portal, you will also be able to view your health information using other applications (apps) compatible with our system.

## 2021-01-14 NOTE — DISCHARGE NOTE PROVIDER - CARE PROVIDER_API CALL
Tomi Acuna)  Cardiovascular Surgery  [address], [address] [place] [address]  Phone: [phone]  Fax: [phone]  Follow Up Time:     Jerson Sheth  CARDIOVASCULAR DISEASE  [address] [place] [address]  Phone: [phone]  Fax: [phone]  Follow Up Time:    Tomi Acuna)  Cardiovascular Surgery  [address], [address]th [place] [address]  Phone: [phone]  Fax: [phone]  Scheduled Appointment: 01/19/2021 01:45 PM    Jerson Sheth  CARDIOVASCULAR DISEASE  [address] [place] [address]  Phone: [phone]  Fax: [phone]  Follow Up Time:     TODD WILSON  Internal Medicine  [address] [place] [address]  Phone: [phone]  Fax: [phone]  Follow Up Time:

## 2021-01-14 NOTE — DISCHARGE NOTE PROVIDER - NSDCFUADDINST_GEN_ALL_CORE_FT
-You have stitches that are to remain in place on your left upper chest. These will be removed at your follow up appointment. You do not have to cover these or put anything on them. Leave the stitches open to air.     -Walk daily as tolerated and use your incentive spirometer every hour.     -No driving or strenuous activity/exercise until cleared by your surgeon.    -Gently clean your incisions with unscented/antibacterial soap and water, pat dry.  You may leave them open to air.    -Call your doctor if you have shortness of breath, chest pain not relieved by pain medication, dizziness, fever >101.5, or increased redness or drainage from incisions.   -Please see your doctor who follows your diabetes within 1-2 weeks from discharge from the hospital. (Dr. Jacobson)     -You have stitches that are to remain in place on your left upper chest. These will be removed at your follow up appointment. You do not have to cover these or put anything on them. Leave the stitches open to air.     -Walk daily as tolerated and use your incentive spirometer every hour.     -No driving or strenuous activity/exercise until cleared by your surgeon.    -Gently clean your incisions with unscented/antibacterial soap and water, pat dry.  You may leave them open to air.    -Call your doctor if you have shortness of breath, chest pain not relieved by pain medication, dizziness, fever >101.5, or increased redness or drainage from incisions.   -Please see your doctor who follows your diabetes within 1-2 weeks from discharge from the hospital. (Dr. Jacobson)     -You are being discharged with both long acting (night insulin before bedtime) and premeal insulin (short acting 3 times a day before meals). It is very important that you take both of these medications. Please take your blood glucose before administering either of these insulins.     -You have stitches that are to remain in place on your left upper chest. These will be removed at your follow up appointment. You do not have to cover these or put anything on them. Leave the stitches open to air.     -Walk daily as tolerated and use your incentive spirometer every hour.     -No driving or strenuous activity/exercise until cleared by your surgeon.    -Gently clean your incisions with unscented/antibacterial soap and water, pat dry.  You may leave them open to air.    -Call your doctor if you have shortness of breath, chest pain not relieved by pain medication, dizziness, fever >101.5, or increased redness or drainage from incisions.

## 2021-01-14 NOTE — DISCHARGE NOTE PROVIDER - NSDCMRMEDTOKEN_GEN_ALL_CORE_FT
acetaminophen 325 mg oral tablet: 2 tab(s) orally every 6 hours, As needed, Mild Pain (1 - 3)  allopurinol 100 mg oral tablet: orally once a day  Aspirin Enteric Coated 81 mg oral delayed release tablet: 1 tab(s) orally once a day  atorvastatin 40 mg oral tablet: 1 tab(s) orally once a day (at bedtime)  Basaglar KwikPen 100 units/mL subcutaneous solution: 30 unit(s) subcutaneous once a day (at bedtime)  colchicine: 1 tab(s) orally as needed for gout flare   glipiZIDE 5 mg oral tablet, extended release: 1 tab(s) orally once a day  metFORMIN 500 mg oral tablet: 1 tab(s) orally 2 times a day  Toprol-XL 25 mg oral tablet, extended release: 1 tab(s) orally once a day   acetaminophen 325 mg oral tablet: 2 tab(s) orally every 6 hours, As needed, Mild Pain (1 - 3)  allopurinol 100 mg oral tablet: orally once a day  aspirin 81 mg oral delayed release tablet: 1 tab(s) orally once a day  atorvastatin 40 mg oral tablet: 1 tab(s) orally once a day (at bedtime)  clopidogrel 75 mg oral tablet: 1 tab(s) orally once a day  colchicine: 1 tab(s) orally as needed for gout flare   metoprolol tartrate 50 mg oral tablet: 1 tab(s) orally every 12 hours  oxycodone-acetaminophen 5 mg-325 mg oral tablet: 1 tab(s) orally every 6 hours, As Needed -Moderate Pain (4 - 6) MDD:4 tabs  pantoprazole 40 mg oral delayed release tablet: 1 tab(s) orally once a day (before a meal)  polyethylene glycol 3350 oral powder for reconstitution: 17 gram(s) orally once a day   acetaminophen 325 mg oral tablet: 2 tab(s) orally every 6 hours, As needed, Mild Pain (1 - 3)  alcohol swabs : Apply topically to affected area 4 times a day   allopurinol 100 mg oral tablet: orally once a day  aspirin 81 mg oral delayed release tablet: 1 tab(s) orally once a day  atorvastatin 40 mg oral tablet: 1 tab(s) orally once a day (at bedtime)  clopidogrel 75 mg oral tablet: 1 tab(s) orally once a day  colchicine: 1 tab(s) orally as needed for gout flare   glucometer (per patient&#x27;s insurance): Test blood sugars four times a day. Dispense #1 glucometer.  glucose tablets: Follow instructions on bottle when sugar is low.  HumaLOG KwikPen 100 units/mL injectable solution: 8 unit(s) injectable 3 times a day (before meals)   lancets: 1 application subcutaneously 4 times a day   Lantus Solostar Pen 100 units/mL subcutaneous solution: 26 unit(s) subcutaneous once a day (at bedtime)   metoprolol tartrate 50 mg oral tablet: 1 tab(s) orally every 12 hours  oxycodone-acetaminophen 5 mg-325 mg oral tablet: 1 tab(s) orally every 6 hours, As Needed -Moderate Pain (4 - 6) MDD:4 tabs  pantoprazole 40 mg oral delayed release tablet: 1 tab(s) orally once a day (before a meal)  polyethylene glycol 3350 oral powder for reconstitution: 17 gram(s) orally once a day  test strips (per patient&#x27;s insurance): 1 application subcutaneously 4 times a day. ** Compatible with patient&#x27;s glucometer **   acetaminophen 325 mg oral tablet: 2 tab(s) orally every 6 hours, As needed, Mild Pain (1 - 3)  alcohol swabs : Apply topically to affected area 4 times a day   allopurinol 100 mg oral tablet: orally once a day  aspirin 81 mg oral delayed release tablet: 1 tab(s) orally once a day  atorvastatin 40 mg oral tablet: 1 tab(s) orally once a day (at bedtime)  Basaglar KwikPen 100 units/mL subcutaneous solution: 26 unit(s) subcutaneous once a day (at bedtime)   clopidogrel 75 mg oral tablet: 1 tab(s) orally once a day  colchicine: 1 tab(s) orally as needed for gout flare   glucometer (per patient&#x27;s insurance): Test blood sugars four times a day. Dispense #1 glucometer.  glucose tablets: Follow instructions on bottle when sugar is low.  lancets: 1 application subcutaneously 4 times a day   metoprolol tartrate 50 mg oral tablet: 1 tab(s) orally every 12 hours  NovoLOG FlexPen 100 units/mL injectable solution: 8 unit(s) injectable 3 times a day (before meals)   oxycodone-acetaminophen 5 mg-325 mg oral tablet: 1 tab(s) orally every 6 hours, As Needed -Moderate Pain (4 - 6) MDD:4 tabs  pantoprazole 40 mg oral delayed release tablet: 1 tab(s) orally once a day (before a meal)  polyethylene glycol 3350 oral powder for reconstitution: 17 gram(s) orally once a day  test strips (per patient&#x27;s insurance): 1 application subcutaneously 4 times a day. ** Compatible with patient&#x27;s glucometer **   acetaminophen 325 mg oral tablet: 2 tab(s) orally every 6 hours, As needed, Mild Pain (1 - 3)  alcohol swabs : Apply topically to affected area 4 times a day   allopurinol 100 mg oral tablet: orally once a day  aspirin 81 mg oral delayed release tablet: 1 tab(s) orally once a day  atorvastatin 40 mg oral tablet: 1 tab(s) orally once a day (at bedtime)  Basaglar KwikPen 100 units/mL subcutaneous solution: 26 unit(s) subcutaneous once a day (at bedtime)   clopidogrel 75 mg oral tablet: 1 tab(s) orally once a day  colchicine: 1 tab(s) orally as needed for gout flare   glucometer (per patient&#x27;s insurance): Test blood sugars four times a day. Dispense #1 glucometer.  glucose tablets: Follow instructions on bottle when sugar is low.  Insulin Pen Needles, 4mm: 1 application subcutaneously 4 times a day. ** Use with insulin pen **   lancets: 1 application subcutaneously 4 times a day   metoprolol tartrate 50 mg oral tablet: 1 tab(s) orally every 12 hours  NovoLOG FlexPen 100 units/mL injectable solution: 8 unit(s) injectable 3 times a day (before meals)   oxycodone-acetaminophen 5 mg-325 mg oral tablet: 1 tab(s) orally every 6 hours, As Needed -Moderate Pain (4 - 6) MDD:4 tabs  pantoprazole 40 mg oral delayed release tablet: 1 tab(s) orally once a day (before a meal)  polyethylene glycol 3350 oral powder for reconstitution: 17 gram(s) orally once a day  test strips (per patient&#x27;s insurance): 1 application subcutaneously 4 times a day. ** Compatible with patient&#x27;s glucometer **

## 2021-01-14 NOTE — DISCHARGE NOTE PROVIDER - PROVIDER TOKENS
PROVIDER:[TOKEN:[[id number]]],PROVIDER:[TOKEN:[[id number]]] PROVIDER:[TOKEN:[[id number]],SCHEDULEDAPPT:[01/19/2021],SCHEDULEDAPPTTIME:[01:45 PM]],PROVIDER:[TOKEN:[[id number]]],PROVIDER:[TOKEN:[id number]]]

## 2021-01-14 NOTE — DISCHARGE NOTE PROVIDER - NSDCCPTREATMENT_GEN_ALL_CORE_FT
PRINCIPAL PROCEDURE  Procedure: Minimally invasive direct coronary artery bypass (MIDCAB) with transesophageal echocardiography (YANETH)  Findings and Treatment:

## 2021-01-14 NOTE — DISCHARGE NOTE PROVIDER - NSDCFUADDAPPT_GEN_ALL_CORE_FT
-Please attend your discharge appointments.  -Please attend your discharge appointments.   -Please call Dr. Sheth's office and make an appointment 1-2 weeks from discharge from the hospital. Please call our office if you need help making your appointment.   -Please make an appointment with Dr. Jacobson regarding your diabetes 1-2 weeks from being discharged from the hospital to follow up with your diabetes.

## 2021-01-14 NOTE — PROGRESS NOTE ADULT - SUBJECTIVE AND OBJECTIVE BOX
Patient discussed on morning rounds with Dr. Acuna      Operation / Date: 1/11/20 - MIDCAB, EF 55%     Surgeon: Dr. Acuna     Referring Physician: Dr. Jerson Sheth     SUBJECTIVE ASSESSMENT  Patient is feeling well and looking forward to going home today. Eating/drinking well. Ambulating without difficulty Moving his bowel regularly. Denies any CP, palpitations, SOB, wheezing, abd pain, n/v/d/c, fevers or chills.     HOSPITAL COURSE:   This is a 71 y/o male with PMHx of HTN, HLD, DM, CAD s/p PCIs who presented to [facility] with class III angina s/p cardiac cath that revealed severe 3 vessel CAD. CT surgery, Dr. Acuna, was consulted for hybrid procedure and on 1/11/21 he underwent an uncomplicated MIDCAB, EF nl.  He was extubated POD 0 and on POD1 CT removed, BB started, and he was transferred to telemetry. POD 2, he is clinically stable and his moise was removed. Ambulating well. POD 3 +BM. Ambulate without difficulty, pulling 1500cc on IS. Per Dr. Acuna, pt is medically ready to be discharged home.    Vital Signs Last 24 Hrs  T(C): 36.6 (14 Jan 2021 05:01), Max: 37.8 (13 Jan 2021 21:42)  T(F): 97.9 (14 Jan 2021 05:01), Max: 100 (13 Jan 2021 21:42)  HR: 83 (14 Jan 2021 08:32) (83 - 94)  BP: 115/84 (14 Jan 2021 08:32) (103/66 - 163/91)  BP(mean): 95 (14 Jan 2021 08:32) (80 - 120)  RR: 20 (14 Jan 2021 08:32) (16 - 20)  SpO2: 97% (14 Jan 2021 08:32) (92% - 99%)    EPICARDIAL WIRES REMOVED: n/a   TIE DOWNS REMOVED: NO - left upper stitch remains in place     PHYSICAL EXAM:  General: well appearing sitting in chair   Neurological: AOx3. Motor skills grossly intact  Cardiovascular: Normal S1/S2. Regular rate/rhythm. No murmurs  Respiratory: Lungs CTA bilaterally. No wheezing or rales  Gastrointestinal: +BS in all 4 quadrants. Non-distended. Soft. Non-tender  Extremities: Strength 5/5 b/l upper/lower extremities. Sensation grossly intact upper/lower extremities. No edema. No calf tenderness.  Vascular: Radial 2+bilaterally, DP 2+ b/l  Incision Sites: left inframammary thoracotomy incision without erythema, purulence or ecchymosis.     LABS:             11.7   14.87 )-----------( 232      ( 14 Jan 2021 06:13 )             36.2     COUMADIN: No    PT/INR - ( 12 Jan 2021 11:12 )   PT: 13.3 sec;   INR: 1.11     PTT - ( 12 Jan 2021 11:12 )  PTT:28.6 sec    01-14    141  |  105  |  15  ----------------------------<  139<H>  4.5   |  27  |  1.03    Ca    8.4      14 Jan 2021 06:13  Phos  3.6     01-12  Mg     1.9     01-14    TPro  6.8  /  Alb  3.6  /  TBili  1.4<H>  /  DBili  x   /  AST  21  /  ALT  14  /  AlkPhos  86  01-12      Discharge CXR:  < from: Xray Chest 2 Views PA/Lat (01.13.21 @ 12:02) >  IMPRESSION:  Subcutaneous emphysema along the left lateral chest wall.  No evidence of definite pneumothorax or pneumomediastinum.  Interval right IJV catheter removal.  Probable crowding of the right infrahilar pulmonary vessels versus atelectasis unchanged from prior imaging.  < end of copied text >    Discharge ECHO:  n/a

## 2021-01-14 NOTE — PROGRESS NOTE ADULT - ASSESSMENT
D/C Instructions:     Tomi Acuna)  Cardiovascular Surgery  [address]th [place] [address]  Phone: [phone]  Fax: [phone]  Follow Up Time:     Alex Shethjimmie HASSAN  CARDIOVASCULAR DISEASE  [address] [place] [address]  Phone: [phone]  Fax: [phone]  Follow Up Time:    NOEMI JACOBS ; 01/19/2021 ; NPP CT Surg [address] [place] [address]    -Please attend your discharge appointments.    -DASH Diet:  1. Grains: 6-8 servings per day. Examples: Whole grains/pasta, brown rice.  2. Fruits and Vegetables: 4-5 servings per day each.  3. Dairy: 2-3 servings per day. Examples: Low-fat or fat free yogurt, low-fat or skim milk or cheeses.   4. Lean Meat (Fish & Poultry): No more than 6oz. in a day. Avoid Red Meat.   5. Nuts/seeds: 4-5 servings per WEEK. Examples: Almonds, sunflower seeds, lentils. Avoid salted nuts.   6. Fats/oils: 2-3 servings per day. Examples: Olive oil, fat-free low-sodium spreads. Avoid fried foods, lard or butter.  7. Sweets: Sparingly, less than 2-3 servings per week.  No heavy lifting/straining, Showering allowed, Stairs allowed, Walking - Indoors allowed, Walking - Outdoors allowed    -You have stitches that are to remain in place on your left upper chest. These will be removed at your follow up appointment. You do not have to cover these or put anything on them. Leave the stitches open to air.     -Walk daily as tolerated and use your incentive spirometer every hour.     -No driving or strenuous activity/exercise until cleared by your surgeon.    -Gently clean your incisions with unscented/antibacterial soap and water, pat dry.  You may leave them open to air.    -Call your doctor if you have shortness of breath, chest pain not relieved by pain medication, dizziness, fever >101.5, or increased redness or drainage from incisions.

## 2021-01-14 NOTE — DISCHARGE NOTE PROVIDER - INSTRUCTIONS
DASH Diet:  1. Grains: 6-8 servings per day. Examples: Whole grains/pasta, brown rice.  2. Fruits and Vegetables: 4-5 servings per day each.  3. Dairy: 2-3 servings per day. Examples: Low-fat or fat free yogurt, low-fat or skim milk or cheeses.   4. Lean Meat (Fish & Poultry): No more than 6oz. in a day. Avoid Red Meat.   5. Nuts/seeds: 4-5 servings per WEEK. Examples: Almonds, sunflower seeds, lentils. Avoid salted nuts.   6. Fats/oils: 2-3 servings per day. Examples: Olive oil, fat-free low-sodium spreads. Avoid fried foods, lard or butter.  7. Sweets: Sparingly, less than 2-3 servings per week.

## 2021-01-14 NOTE — DISCHARGE NOTE PROVIDER - CARE PROVIDERS DIRECT ADDRESSES
,[email or web address]_Unknown [email or web address],DirectAddress_Unknown,DirectAddress_Unknown

## 2021-01-14 NOTE — DISCHARGE NOTE NURSING/CASE MANAGEMENT/SOCIAL WORK - NSDCFUADDAPPT_GEN_ALL_CORE_FT
-Please attend your discharge appointments.   -Please call Dr. Sheth's office and make an appointment 1-2 weeks from discharge from the hospital. Please call our office if you need help making your appointment.   -Please make an appointment with Dr. Jacobson regarding your diabetes 1-2 weeks from being discharged from the hospital to follow up with your diabetes.

## 2021-01-14 NOTE — CHART NOTE - NSCHARTNOTEFT_GEN_A_CORE
labs reviewed.  FSG & Insulin received:    Yesterday:  pre-dinner fs  nutritional lispro  4 units + 2  units lispro SS  bedtime fs  lantus 22  units     Today:  pre-breakfast fs  nutritional lispro  8 units+   2 units lispro SS    A/P:71 y/o male, former smoker, w/ PMHx  of known CAD (s/p PCI of OM2, 12 years ago), HTN, HLD, gout, and type II DM.  s/p MIDCAB on 2021. [facility] consulted for DM management.     1.  DM type 2  A1c:8.1%  weight:92 kg, BMI:29.9  Cr:1.1, GFR:67    For discharge  Please continue lantus    26   units at night .  Please continue lispro  8    units before each meal.      Pt's fingerstick glucose goal is 100-180    Will continue to monitor     Pt can follow up at discharge with [facility] by calling  to make an appointment.    case  discussed with Dr. Ramirez  and update primary team labs reviewed.  FSG & Insulin received:    Yesterday:  pre-dinner fs  nutritional lispro  4 units + 2  units lispro SS  bedtime fs  lantus 22  units     Today:  pre-breakfast fs  nutritional lispro  8 units+   2 units lispro SS    A/P:69 y/o male, former smoker, w/ PMHx  of known CAD (s/p PCI of OM2, 12 years ago), HTN, HLD, gout, and type II DM.  s/p MIDCAB on 2021. [facility] consulted for DM management.     1.  DM type 2  A1c:8.1%  weight:92 kg, BMI:29.9  Cr:1.1, GFR:67    For discharge  Please continue lantus    26   units at night .  Please continue lispro  8    units before each meal.      Pt's fingerstick glucose goal is 100-180    Will continue to monitor     Pt can follow up at discharge with [facility] by calling  to make an appointment.    case  discussed with Dr. Ramirez  and update primary team    Attending:  Above discussed with Dr. Dexter.  Being unsure at this point whether pt's meal coverage would be adequately handled by an oral agent, will discharge on a full basal/bolus regimen    JOSHUA Ramirez MD

## 2021-01-14 NOTE — DISCHARGE NOTE PROVIDER - HOSPITAL COURSE
This is a 69 y/o male with PMHx of HTN, HLD, DM, CAD s/p PCIs who presented to [facility] with class III angina s/p cardiac cath that revealed severe 3 vessel CAD. CT surgery, Dr. Acuna, was consulted for hybrid procedure and on 1/11/21 he underwent an uncomplicated MIDCAB, EF nl.  He was extubated POD 0 and on POD1 CT removed, BB started, and he was transferred to telemetry. POD 2, he is clinically stable and his moise was removed. Ambulating well. POD 3 +BM. Ambulate without difficulty, pulling 1500cc on IS. Per Dr. Acuna, pt is medically ready to be discharged home.    Over 35 minutes was spent with the patient reviewing the discharge material including medications, follow up appointments, recovery, concerning symptoms, and how to contact their health care providers if they have questions    CABG  Aspirin               [ x ] Yes  [  ] Contraindicated, Reason_______________________________  Beta-Blocker     [ x ] Yes  [  ]Contraindicated, Reason_______________________________  Statin                 [ x ] Yes  [  ] Contraindicated, Reason_______________________________

## 2021-01-15 DIAGNOSIS — Z01.818 ENCOUNTER FOR OTHER PREPROCEDURAL EXAMINATION: ICD-10-CM

## 2021-01-15 DIAGNOSIS — Z09 ENCOUNTER FOR FOLLOW-UP EXAMINATION AFTER COMPLETED TREATMENT FOR CONDITIONS OTHER THAN MALIGNANT NEOPLASM: ICD-10-CM

## 2021-01-15 DIAGNOSIS — Z95.1 PRESENCE OF AORTOCORONARY BYPASS GRAFT: ICD-10-CM

## 2021-01-15 RX ORDER — PANTOPRAZOLE 40 MG/1
40 TABLET, DELAYED RELEASE ORAL DAILY
Qty: 30 | Refills: 2 | Status: ACTIVE | COMMUNITY

## 2021-01-15 RX ORDER — INSULIN GLARGINE 100 [IU]/ML
100 INJECTION, SOLUTION SUBCUTANEOUS AT BEDTIME
Refills: 0 | Status: ACTIVE | COMMUNITY

## 2021-01-15 RX ORDER — INSULIN ASPART 100 [IU]/ML
100 INJECTION, SOLUTION INTRAVENOUS; SUBCUTANEOUS
Refills: 1 | Status: ACTIVE | COMMUNITY

## 2021-01-15 RX ORDER — GLIPIZIDE 5 MG/1
5 TABLET ORAL DAILY
Qty: 30 | Refills: 3 | Status: COMPLETED | COMMUNITY
End: 2021-01-15

## 2021-01-15 RX ORDER — METFORMIN HYDROCHLORIDE 500 MG/1
500 TABLET, COATED ORAL TWICE DAILY
Qty: 60 | Refills: 1 | Status: COMPLETED | COMMUNITY
End: 2021-01-15

## 2021-01-15 RX ORDER — METOPROLOL SUCCINATE 25 MG/1
25 TABLET, EXTENDED RELEASE ORAL DAILY
Qty: 30 | Refills: 6 | Status: COMPLETED | COMMUNITY
End: 2021-01-15

## 2021-01-15 RX ORDER — COLCHICINE 0.6 MG/1
0.6 CAPSULE ORAL
Qty: 20 | Refills: 0 | Status: ACTIVE | COMMUNITY

## 2021-01-15 RX ORDER — TELMISARTAN 40 MG/1
40 TABLET ORAL DAILY
Refills: 0 | Status: COMPLETED | COMMUNITY
End: 2021-01-15

## 2021-01-15 RX ORDER — METOPROLOL TARTRATE 50 MG/1
50 TABLET, FILM COATED ORAL
Qty: 60 | Refills: 0 | Status: ACTIVE | COMMUNITY

## 2021-01-15 RX ORDER — CLOPIDOGREL BISULFATE 75 MG/1
75 TABLET, FILM COATED ORAL DAILY
Refills: 0 | Status: ACTIVE | COMMUNITY

## 2021-01-15 RX ORDER — OXYCODONE HYDROCHLORIDE AND ACETAMINOPHEN 5; 325 MG/1; MG/1
5-325 TABLET ORAL
Refills: 0 | Status: ACTIVE | COMMUNITY

## 2021-01-19 ENCOUNTER — OUTPATIENT (OUTPATIENT)
Dept: OUTPATIENT SERVICES | Facility: HOSPITAL | Age: 71
LOS: 1 days | End: 2021-01-19
Payer: MEDICARE

## 2021-01-19 ENCOUNTER — APPOINTMENT (OUTPATIENT)
Dept: CARDIOTHORACIC SURGERY | Facility: CLINIC | Age: 71
End: 2021-01-19
Payer: MEDICARE

## 2021-01-19 VITALS
BODY MASS INDEX: 29.47 KG/M2 | WEIGHT: 199 LBS | OXYGEN SATURATION: 98 % | RESPIRATION RATE: 17 BRPM | TEMPERATURE: 96.9 F | HEART RATE: 75 BPM | SYSTOLIC BLOOD PRESSURE: 182 MMHG | DIASTOLIC BLOOD PRESSURE: 89 MMHG | HEIGHT: 69 IN

## 2021-01-19 DIAGNOSIS — Z98.890 OTHER SPECIFIED POSTPROCEDURAL STATES: Chronic | ICD-10-CM

## 2021-01-19 PROCEDURE — 71046 X-RAY EXAM CHEST 2 VIEWS: CPT

## 2021-01-19 PROCEDURE — 99024 POSTOP FOLLOW-UP VISIT: CPT

## 2021-01-19 PROCEDURE — 71046 X-RAY EXAM CHEST 2 VIEWS: CPT | Mod: 26

## 2021-01-22 DIAGNOSIS — R00.1 BRADYCARDIA, UNSPECIFIED: ICD-10-CM

## 2021-01-22 DIAGNOSIS — E83.41 HYPERMAGNESEMIA: ICD-10-CM

## 2021-01-22 DIAGNOSIS — I10 ESSENTIAL (PRIMARY) HYPERTENSION: ICD-10-CM

## 2021-01-22 DIAGNOSIS — Z79.84 LONG TERM (CURRENT) USE OF ORAL HYPOGLYCEMIC DRUGS: ICD-10-CM

## 2021-01-22 DIAGNOSIS — Z98.42 CATARACT EXTRACTION STATUS, LEFT EYE: ICD-10-CM

## 2021-01-22 DIAGNOSIS — Z87.891 PERSONAL HISTORY OF NICOTINE DEPENDENCE: ICD-10-CM

## 2021-01-22 DIAGNOSIS — I34.0 NONRHEUMATIC MITRAL (VALVE) INSUFFICIENCY: ICD-10-CM

## 2021-01-22 DIAGNOSIS — I25.119 ATHEROSCLEROTIC HEART DISEASE OF NATIVE CORONARY ARTERY WITH UNSPECIFIED ANGINA PECTORIS: ICD-10-CM

## 2021-01-22 DIAGNOSIS — M10.9 GOUT, UNSPECIFIED: ICD-10-CM

## 2021-01-22 DIAGNOSIS — E78.5 HYPERLIPIDEMIA, UNSPECIFIED: ICD-10-CM

## 2021-01-22 DIAGNOSIS — Z79.82 LONG TERM (CURRENT) USE OF ASPIRIN: ICD-10-CM

## 2021-01-22 DIAGNOSIS — E11.9 TYPE 2 DIABETES MELLITUS WITHOUT COMPLICATIONS: ICD-10-CM

## 2021-01-22 DIAGNOSIS — I25.2 OLD MYOCARDIAL INFARCTION: ICD-10-CM

## 2021-02-17 ENCOUNTER — TRANSCRIPTION ENCOUNTER (OUTPATIENT)
Age: 71
End: 2021-02-17

## 2021-06-18 ENCOUNTER — NON-APPOINTMENT (OUTPATIENT)
Age: 71
End: 2021-06-18

## 2021-06-29 ENCOUNTER — APPOINTMENT (OUTPATIENT)
Dept: CARDIOTHORACIC SURGERY | Facility: CLINIC | Age: 71
End: 2021-06-29

## 2022-12-13 ENCOUNTER — NON-APPOINTMENT (OUTPATIENT)
Age: 72
End: 2022-12-13
